# Patient Record
Sex: FEMALE | Race: OTHER | HISPANIC OR LATINO | URBAN - METROPOLITAN AREA
[De-identification: names, ages, dates, MRNs, and addresses within clinical notes are randomized per-mention and may not be internally consistent; named-entity substitution may affect disease eponyms.]

---

## 2017-08-25 ENCOUNTER — EMERGENCY (EMERGENCY)
Facility: HOSPITAL | Age: 22
LOS: 1 days | Discharge: HOME | End: 2017-08-25
Attending: EMERGENCY MEDICINE
Payer: MEDICAID

## 2017-08-25 VITALS
WEIGHT: 154.98 LBS | SYSTOLIC BLOOD PRESSURE: 120 MMHG | HEART RATE: 83 BPM | HEIGHT: 62 IN | TEMPERATURE: 97 F | DIASTOLIC BLOOD PRESSURE: 68 MMHG | RESPIRATION RATE: 18 BRPM | OXYGEN SATURATION: 99 %

## 2017-08-25 VITALS
HEART RATE: 86 BPM | DIASTOLIC BLOOD PRESSURE: 58 MMHG | OXYGEN SATURATION: 100 % | RESPIRATION RATE: 16 BRPM | SYSTOLIC BLOOD PRESSURE: 111 MMHG

## 2017-08-25 DIAGNOSIS — M54.5 LOW BACK PAIN: ICD-10-CM

## 2017-08-25 LAB
APPEARANCE UR: CLEAR — SIGNIFICANT CHANGE UP
BILIRUB UR-MCNC: NEGATIVE — SIGNIFICANT CHANGE UP
COLOR SPEC: YELLOW — SIGNIFICANT CHANGE UP
DIFF PNL FLD: ABNORMAL
GLUCOSE UR QL: NEGATIVE — SIGNIFICANT CHANGE UP
HCG UR QL: NEGATIVE — SIGNIFICANT CHANGE UP
KETONES UR-MCNC: NEGATIVE — SIGNIFICANT CHANGE UP
LEUKOCYTE ESTERASE UR-ACNC: ABNORMAL
NITRITE UR-MCNC: NEGATIVE — SIGNIFICANT CHANGE UP
PH UR: 6 — SIGNIFICANT CHANGE UP (ref 5–8)
PROT UR-MCNC: 30 MG/DL
SP GR SPEC: 1.01 — SIGNIFICANT CHANGE UP (ref 1.01–1.02)
UROBILINOGEN FLD QL: NEGATIVE — SIGNIFICANT CHANGE UP

## 2017-08-25 PROCEDURE — 74176 CT ABD & PELVIS W/O CONTRAST: CPT

## 2017-08-25 PROCEDURE — 99284 EMERGENCY DEPT VISIT MOD MDM: CPT | Mod: 25

## 2017-08-25 PROCEDURE — 99284 EMERGENCY DEPT VISIT MOD MDM: CPT

## 2017-08-25 PROCEDURE — 74176 CT ABD & PELVIS W/O CONTRAST: CPT | Mod: 26

## 2017-08-25 PROCEDURE — 87086 URINE CULTURE/COLONY COUNT: CPT

## 2017-08-25 PROCEDURE — 81025 URINE PREGNANCY TEST: CPT

## 2017-08-25 PROCEDURE — 81001 URINALYSIS AUTO W/SCOPE: CPT

## 2017-08-25 RX ORDER — METHOCARBAMOL 500 MG/1
2 TABLET, FILM COATED ORAL
Qty: 24 | Refills: 0
Start: 2017-08-25 | End: 2017-08-29

## 2017-08-25 RX ORDER — METHOCARBAMOL 500 MG/1
750 TABLET, FILM COATED ORAL ONCE
Qty: 0 | Refills: 0 | Status: COMPLETED | OUTPATIENT
Start: 2017-08-25 | End: 2017-08-25

## 2017-08-25 RX ADMIN — METHOCARBAMOL 750 MILLIGRAM(S): 500 TABLET, FILM COATED ORAL at 14:51

## 2017-08-25 RX ADMIN — Medication 500 MILLIGRAM(S): at 14:51

## 2017-08-25 NOTE — ED PROVIDER NOTE - MEDICAL DECISION MAKING DETAILS
20 y/o female c/o waxing and waning low back pain x 3 days. Will do UA/preg, treat pain and dc home with OP follow-up 20 y/o female c/o waxing and waning low back pain x 3 days. Will do UA/preg, treat pain and dc home with pain med/OP follow-up

## 2017-08-25 NOTE — ED ADULT NURSE NOTE - OBJECTIVE STATEMENT
C/O LOWER BACK PAIN RADIATES TO LOWER LEGS AND UPPER BACK X4 DAYS. DENIES INJURY OR FALL.SEEN AND EXAMINED BY DR. JAIN.

## 2017-08-25 NOTE — ED PROVIDER NOTE - OBJECTIVE STATEMENT
22 y/o female with no significant PMHx presents to ED c/o gradual onset, waxing and waning worsening low back pain x 3 days. Patient notes the pain radiates up her back. She has been taking Motrin 600 mg with mild sx relief. Patient has never had similar pain in the past. She reports the pain is worse when bending over or changing positions. Patient denies any neck pain, saddle anesthesia, Gu or GI problems, recent injury or trauma or any other complaints at this time. LMP 4 months ago due to hormonal contraceptive.

## 2017-08-26 LAB
CULTURE RESULTS: NO GROWTH — SIGNIFICANT CHANGE UP
SPECIMEN SOURCE: SIGNIFICANT CHANGE UP

## 2017-09-13 ENCOUNTER — EMERGENCY (EMERGENCY)
Facility: HOSPITAL | Age: 22
LOS: 1 days | Discharge: HOME | End: 2017-09-13
Attending: EMERGENCY MEDICINE
Payer: MEDICAID

## 2017-09-13 VITALS
HEART RATE: 74 BPM | RESPIRATION RATE: 16 BRPM | DIASTOLIC BLOOD PRESSURE: 70 MMHG | TEMPERATURE: 98 F | SYSTOLIC BLOOD PRESSURE: 120 MMHG | OXYGEN SATURATION: 98 %

## 2017-09-13 VITALS
WEIGHT: 154.1 LBS | SYSTOLIC BLOOD PRESSURE: 116 MMHG | OXYGEN SATURATION: 100 % | HEIGHT: 62 IN | DIASTOLIC BLOOD PRESSURE: 72 MMHG | TEMPERATURE: 98 F | HEART RATE: 77 BPM | RESPIRATION RATE: 16 BRPM

## 2017-09-13 DIAGNOSIS — M54.5 LOW BACK PAIN: ICD-10-CM

## 2017-09-13 LAB
ANION GAP SERPL CALC-SCNC: 6 MMOL/L — SIGNIFICANT CHANGE UP (ref 5–17)
APPEARANCE UR: CLEAR — SIGNIFICANT CHANGE UP
BASOPHILS # BLD AUTO: 0.1 K/UL — SIGNIFICANT CHANGE UP (ref 0–0.2)
BASOPHILS NFR BLD AUTO: 0.6 % — SIGNIFICANT CHANGE UP (ref 0–2)
BILIRUB UR-MCNC: NEGATIVE — SIGNIFICANT CHANGE UP
BUN SERPL-MCNC: 13 MG/DL — SIGNIFICANT CHANGE UP (ref 7–18)
CALCIUM SERPL-MCNC: 8.9 MG/DL — SIGNIFICANT CHANGE UP (ref 8.4–10.5)
CHLORIDE SERPL-SCNC: 105 MMOL/L — SIGNIFICANT CHANGE UP (ref 96–108)
CO2 SERPL-SCNC: 27 MMOL/L — SIGNIFICANT CHANGE UP (ref 22–31)
COLOR SPEC: YELLOW — SIGNIFICANT CHANGE UP
CREAT SERPL-MCNC: 0.95 MG/DL — SIGNIFICANT CHANGE UP (ref 0.5–1.3)
DIFF PNL FLD: NEGATIVE — SIGNIFICANT CHANGE UP
EOSINOPHIL # BLD AUTO: 0.1 K/UL — SIGNIFICANT CHANGE UP (ref 0–0.5)
EOSINOPHIL NFR BLD AUTO: 1.4 % — SIGNIFICANT CHANGE UP (ref 0–6)
GLUCOSE SERPL-MCNC: 149 MG/DL — HIGH (ref 70–99)
GLUCOSE UR QL: NEGATIVE — SIGNIFICANT CHANGE UP
HCG UR QL: NEGATIVE — SIGNIFICANT CHANGE UP
HCT VFR BLD CALC: 41.2 % — SIGNIFICANT CHANGE UP (ref 34.5–45)
HGB BLD-MCNC: 13.9 G/DL — SIGNIFICANT CHANGE UP (ref 11.5–15.5)
KETONES UR-MCNC: NEGATIVE — SIGNIFICANT CHANGE UP
LEUKOCYTE ESTERASE UR-ACNC: ABNORMAL
LYMPHOCYTES # BLD AUTO: 2.8 K/UL — SIGNIFICANT CHANGE UP (ref 1–3.3)
LYMPHOCYTES # BLD AUTO: 27.2 % — SIGNIFICANT CHANGE UP (ref 13–44)
MCHC RBC-ENTMCNC: 28.6 PG — SIGNIFICANT CHANGE UP (ref 27–34)
MCHC RBC-ENTMCNC: 33.7 GM/DL — SIGNIFICANT CHANGE UP (ref 32–36)
MCV RBC AUTO: 84.7 FL — SIGNIFICANT CHANGE UP (ref 80–100)
MONOCYTES # BLD AUTO: 0.6 K/UL — SIGNIFICANT CHANGE UP (ref 0–0.9)
MONOCYTES NFR BLD AUTO: 6.1 % — SIGNIFICANT CHANGE UP (ref 2–14)
NEUTROPHILS # BLD AUTO: 6.7 K/UL — SIGNIFICANT CHANGE UP (ref 1.8–7.4)
NEUTROPHILS NFR BLD AUTO: 64.6 % — SIGNIFICANT CHANGE UP (ref 43–77)
NITRITE UR-MCNC: NEGATIVE — SIGNIFICANT CHANGE UP
PH UR: 5 — SIGNIFICANT CHANGE UP (ref 5–8)
PLATELET # BLD AUTO: 208 K/UL — SIGNIFICANT CHANGE UP (ref 150–400)
POTASSIUM SERPL-MCNC: 3.9 MMOL/L — SIGNIFICANT CHANGE UP (ref 3.5–5.3)
POTASSIUM SERPL-SCNC: 3.9 MMOL/L — SIGNIFICANT CHANGE UP (ref 3.5–5.3)
PROT UR-MCNC: 30 MG/DL
RBC # BLD: 4.87 M/UL — SIGNIFICANT CHANGE UP (ref 3.8–5.2)
RBC # FLD: 11.9 % — SIGNIFICANT CHANGE UP (ref 10.3–14.5)
SODIUM SERPL-SCNC: 138 MMOL/L — SIGNIFICANT CHANGE UP (ref 135–145)
SP GR SPEC: 1.01 — SIGNIFICANT CHANGE UP (ref 1.01–1.02)
UROBILINOGEN FLD QL: NEGATIVE — SIGNIFICANT CHANGE UP
WBC # BLD: 10.3 K/UL — SIGNIFICANT CHANGE UP (ref 3.8–10.5)
WBC # FLD AUTO: 10.3 K/UL — SIGNIFICANT CHANGE UP (ref 3.8–10.5)

## 2017-09-13 PROCEDURE — 96374 THER/PROPH/DIAG INJ IV PUSH: CPT

## 2017-09-13 PROCEDURE — 99284 EMERGENCY DEPT VISIT MOD MDM: CPT | Mod: 25

## 2017-09-13 PROCEDURE — 99284 EMERGENCY DEPT VISIT MOD MDM: CPT

## 2017-09-13 PROCEDURE — 81001 URINALYSIS AUTO W/SCOPE: CPT

## 2017-09-13 PROCEDURE — 80048 BASIC METABOLIC PNL TOTAL CA: CPT

## 2017-09-13 PROCEDURE — 81025 URINE PREGNANCY TEST: CPT

## 2017-09-13 PROCEDURE — 85027 COMPLETE CBC AUTOMATED: CPT

## 2017-09-13 RX ORDER — KETOROLAC TROMETHAMINE 30 MG/ML
30 SYRINGE (ML) INJECTION ONCE
Qty: 0 | Refills: 0 | Status: DISCONTINUED | OUTPATIENT
Start: 2017-09-13 | End: 2017-09-13

## 2017-09-13 RX ADMIN — Medication 30 MILLIGRAM(S): at 20:40

## 2017-09-13 RX ADMIN — Medication 30 MILLIGRAM(S): at 20:16

## 2017-09-13 NOTE — ED PROVIDER NOTE - PROGRESS NOTE DETAILS
CT previously done shows bone assessment WNL. Labs WNL. UA slight leukocyte. Patient is sexually active, 1 partner,  unprotected sex. No /GYN complaints. Will send GC/chlamydia cultures. Patient was seen in the ED by Dr Shin (ortho) and will need follow up with him. Patient already takes IBU and muscle relaxant. Pt is well appearing walking with steady gait, stable for discharge and follow up without fail with medical doctor. I had a detailed discussion with the patient and/or guardian regarding the historical points, exam findings, and any diagnostic results supporting the discharge diagnosis. Pt educated on care and need for follow up. Strict return instructions and red flag signs and symptoms discussed with patient. Questions answered. Pt shows understanding of discharge information and agrees to follow.

## 2017-09-13 NOTE — ED PROVIDER NOTE - ATTENDING CONTRIBUTION TO CARE
low back pain  is CC  PE:  mild paralumbar tenderness. no trauma, no CVa tenderness, nonfocal.  A/P:  back pain.  supportive care with pmd and ortho f/u

## 2017-09-13 NOTE — ED PROVIDER NOTE - PHYSICAL EXAMINATION
Back is symmetrical, scapulae are symmetric. Neck has full range of motion. Mild lumbar midline tenderness to palpation. no paraspinal tenderness, deformity or step-offs. All limbs equally strong 5/5 bilaterally. Strong ankle dorsiflexion and plantar flexion against resistance bilaterally. Strong flexion/extension of big toe bilaterally. Pt is able to walk in straight line with steady gait. No recent weight loss or night sweats. No saddle anesthesia, no bowel/bladder incontinence or retention, no lower extremity weakness.

## 2017-09-13 NOTE — ED PROVIDER NOTE - OBJECTIVE STATEMENT
21 year-old female, history of ALL in remission (11 years ago), presents with cc back pain. Gradual onset of lower back pain 1 month ago. No triggering events. Pain is across lower back, aching/pulling, intermittently radiating to bilateral mid- posterior thighs, worsened with bending down. No history of injury. Denies numbness/tingling, focal weakness, saddle anesthesia, IVDU, recent instrumentation, urinary/bowel dysfunction, vaginal bleeding/discharge, urinary symptoms, weight loss, fevers, night sweats, easy bruising, generalized weakness or any other complaints. Was in the ED for similar complaint 3 weeks ago.

## 2017-09-13 NOTE — ED PROVIDER NOTE - MEDICAL DECISION MAKING DETAILS
21 year-old female, presents with persistent lower back pain x 1 month. Well-appearing, vital signs within normal limits, afebrile. No focal neuro deficits. Plan: labs, urine, meds, re-assess with likely dc home.

## 2019-05-29 ENCOUNTER — OUTPATIENT (OUTPATIENT)
Dept: OUTPATIENT SERVICES | Facility: HOSPITAL | Age: 24
LOS: 1 days | End: 2019-05-29
Payer: MEDICAID

## 2019-05-29 ENCOUNTER — RESULT REVIEW (OUTPATIENT)
Age: 24
End: 2019-05-29

## 2019-05-29 DIAGNOSIS — K92.2 GASTROINTESTINAL HEMORRHAGE, UNSPECIFIED: ICD-10-CM

## 2019-05-29 PROBLEM — C95.90 LEUKEMIA, UNSPECIFIED NOT HAVING ACHIEVED REMISSION: Chronic | Status: ACTIVE | Noted: 2017-08-25

## 2019-05-29 LAB — HCG UR QL: NEGATIVE — SIGNIFICANT CHANGE UP

## 2019-05-29 PROCEDURE — 88305 TISSUE EXAM BY PATHOLOGIST: CPT | Mod: 26

## 2019-05-29 PROCEDURE — 88312 SPECIAL STAINS GROUP 1: CPT | Mod: 26

## 2019-05-29 PROCEDURE — 43239 EGD BIOPSY SINGLE/MULTIPLE: CPT

## 2019-05-29 PROCEDURE — 88312 SPECIAL STAINS GROUP 1: CPT

## 2019-05-29 PROCEDURE — 81025 URINE PREGNANCY TEST: CPT

## 2019-05-29 PROCEDURE — 88305 TISSUE EXAM BY PATHOLOGIST: CPT

## 2019-05-30 ENCOUNTER — TRANSCRIPTION ENCOUNTER (OUTPATIENT)
Age: 24
End: 2019-05-30

## 2019-05-31 LAB — SURGICAL PATHOLOGY STUDY: SIGNIFICANT CHANGE UP

## 2022-10-20 ENCOUNTER — RESULT REVIEW (OUTPATIENT)
Age: 27
End: 2022-10-20

## 2023-01-07 ENCOUNTER — INPATIENT (INPATIENT)
Facility: HOSPITAL | Age: 28
LOS: 3 days | Discharge: HOME | DRG: 301 | End: 2023-01-11
Attending: INTERNAL MEDICINE | Admitting: INTERNAL MEDICINE
Payer: MEDICAID

## 2023-01-07 VITALS
HEART RATE: 99 BPM | DIASTOLIC BLOOD PRESSURE: 79 MMHG | HEIGHT: 62 IN | TEMPERATURE: 98 F | WEIGHT: 201.94 LBS | SYSTOLIC BLOOD PRESSURE: 135 MMHG | OXYGEN SATURATION: 99 % | RESPIRATION RATE: 18 BRPM

## 2023-01-07 DIAGNOSIS — I82.890 ACUTE EMBOLISM AND THROMBOSIS OF OTHER SPECIFIED VEINS: ICD-10-CM

## 2023-01-07 LAB
ALBUMIN SERPL ELPH-MCNC: 3.7 G/DL — SIGNIFICANT CHANGE UP (ref 3.5–5)
ALP SERPL-CCNC: 118 U/L — SIGNIFICANT CHANGE UP (ref 40–120)
ALT FLD-CCNC: 15 U/L DA — SIGNIFICANT CHANGE UP (ref 10–60)
ANION GAP SERPL CALC-SCNC: 12 MMOL/L — SIGNIFICANT CHANGE UP (ref 5–17)
AST SERPL-CCNC: 9 U/L — LOW (ref 10–40)
BASOPHILS # BLD AUTO: 0.04 K/UL — SIGNIFICANT CHANGE UP (ref 0–0.2)
BASOPHILS NFR BLD AUTO: 0.3 % — SIGNIFICANT CHANGE UP (ref 0–2)
BILIRUB SERPL-MCNC: 0.3 MG/DL — SIGNIFICANT CHANGE UP (ref 0.2–1.2)
BUN SERPL-MCNC: 17 MG/DL — SIGNIFICANT CHANGE UP (ref 7–18)
CALCIUM SERPL-MCNC: 9.5 MG/DL — SIGNIFICANT CHANGE UP (ref 8.4–10.5)
CHLORIDE SERPL-SCNC: 104 MMOL/L — SIGNIFICANT CHANGE UP (ref 96–108)
CO2 SERPL-SCNC: 25 MMOL/L — SIGNIFICANT CHANGE UP (ref 22–31)
CREAT SERPL-MCNC: 0.9 MG/DL — SIGNIFICANT CHANGE UP (ref 0.5–1.3)
EGFR: 90 ML/MIN/1.73M2 — SIGNIFICANT CHANGE UP
EOSINOPHIL # BLD AUTO: 0.15 K/UL — SIGNIFICANT CHANGE UP (ref 0–0.5)
EOSINOPHIL NFR BLD AUTO: 1.2 % — SIGNIFICANT CHANGE UP (ref 0–6)
FLUAV AG NPH QL: SIGNIFICANT CHANGE UP
FLUBV AG NPH QL: SIGNIFICANT CHANGE UP
GLUCOSE SERPL-MCNC: 113 MG/DL — HIGH (ref 70–99)
HCT VFR BLD CALC: 40.2 % — SIGNIFICANT CHANGE UP (ref 34.5–45)
HGB BLD-MCNC: 13.4 G/DL — SIGNIFICANT CHANGE UP (ref 11.5–15.5)
IMM GRANULOCYTES NFR BLD AUTO: 0.2 % — SIGNIFICANT CHANGE UP (ref 0–0.9)
LIDOCAIN IGE QN: 41 U/L — LOW (ref 73–393)
LYMPHOCYTES # BLD AUTO: 2.74 K/UL — SIGNIFICANT CHANGE UP (ref 1–3.3)
LYMPHOCYTES # BLD AUTO: 21.1 % — SIGNIFICANT CHANGE UP (ref 13–44)
MCHC RBC-ENTMCNC: 25.9 PG — LOW (ref 27–34)
MCHC RBC-ENTMCNC: 33.3 GM/DL — SIGNIFICANT CHANGE UP (ref 32–36)
MCV RBC AUTO: 77.6 FL — LOW (ref 80–100)
MONOCYTES # BLD AUTO: 0.77 K/UL — SIGNIFICANT CHANGE UP (ref 0–0.9)
MONOCYTES NFR BLD AUTO: 5.9 % — SIGNIFICANT CHANGE UP (ref 2–14)
NEUTROPHILS # BLD AUTO: 9.27 K/UL — HIGH (ref 1.8–7.4)
NEUTROPHILS NFR BLD AUTO: 71.3 % — SIGNIFICANT CHANGE UP (ref 43–77)
NRBC # BLD: 0 /100 WBCS — SIGNIFICANT CHANGE UP (ref 0–0)
PLATELET # BLD AUTO: 314 K/UL — SIGNIFICANT CHANGE UP (ref 150–400)
POTASSIUM SERPL-MCNC: 3.6 MMOL/L — SIGNIFICANT CHANGE UP (ref 3.5–5.3)
POTASSIUM SERPL-SCNC: 3.6 MMOL/L — SIGNIFICANT CHANGE UP (ref 3.5–5.3)
PROT SERPL-MCNC: 7.9 G/DL — SIGNIFICANT CHANGE UP (ref 6–8.3)
RBC # BLD: 5.18 M/UL — SIGNIFICANT CHANGE UP (ref 3.8–5.2)
RBC # FLD: 13.3 % — SIGNIFICANT CHANGE UP (ref 10.3–14.5)
SARS-COV-2 RNA SPEC QL NAA+PROBE: SIGNIFICANT CHANGE UP
SODIUM SERPL-SCNC: 141 MMOL/L — SIGNIFICANT CHANGE UP (ref 135–145)
WBC # BLD: 13 K/UL — HIGH (ref 3.8–10.5)
WBC # FLD AUTO: 13 K/UL — HIGH (ref 3.8–10.5)

## 2023-01-07 PROCEDURE — 99285 EMERGENCY DEPT VISIT HI MDM: CPT

## 2023-01-07 RX ORDER — MORPHINE SULFATE 50 MG/1
4 CAPSULE, EXTENDED RELEASE ORAL ONCE
Refills: 0 | Status: DISCONTINUED | OUTPATIENT
Start: 2023-01-07 | End: 2023-01-07

## 2023-01-07 RX ADMIN — MORPHINE SULFATE 4 MILLIGRAM(S): 50 CAPSULE, EXTENDED RELEASE ORAL at 22:17

## 2023-01-07 NOTE — ED PROVIDER NOTE - DIFFERENTIAL DIAGNOSIS
spelnic vein thrombosis concerns include but not limited to hypercoag state, infection and inflammation Differential Diagnosis

## 2023-01-07 NOTE — ED PROVIDER NOTE - PHYSICAL EXAMINATION
Gen.  no acute distress no resp distress  HEENT:  perrl eomi  Lungs:  b/l bs  CVS: S1S2   Abd;  no guarding no distention., + ttp over left upper quad and + CVA tend on left side  Ext: no edema no erythema no calf tend  Neuro: aaox3 no focal deficit  MSK: strength 5/5 b/l upper and lower ext.

## 2023-01-07 NOTE — ED ADULT NURSE NOTE - OBJECTIVE STATEMENT
27 yr olf female patient presented to the ER aox4 ambulatory complains of left upper quadrant abdominal pain for the past 5 days. PMHX CA. .  Patient stated that had an MRI  done on 1/2/2023. Reports showed that patient has an enlarge spleen and liver. Awaiting for MD to see and examined patient. Will continue to monitor.

## 2023-01-07 NOTE — ED PROVIDER NOTE - CLINICAL SUMMARY MEDICAL DECISION MAKING FREE TEXT BOX
ATTG: : Assessment and plan: Abdominal pain with recently diagnosed splenic vein thrombosis.  We will check labs check coags and consult vascular for recommendations.  Likely admit for further care and work-up

## 2023-01-07 NOTE — ED PROVIDER NOTE - NS ED ROS FT
Constitutional: no fever no chills  Eyes: no conjunctivitis  Ears: no ear pain no tinnitus  Nose: no nasal congestion, Mouth/Throat: no throat pain, Neck: no stiffness  Cardiovascular: no chest pain  Respiratory: no shortness of breath no cough  Gastrointestinal: see hpi  MSK: no joint pain  : no  dysuria  Skin: no rash  Neuro: no LOC no seizures

## 2023-01-07 NOTE — ED ADULT NURSE NOTE - CAS EDN DISCHARGE ASSESSMENT
No Heavy lifting/straining Alert and oriented to person, place and time Showering allowed/No Heavy lifting/straining

## 2023-01-07 NOTE — ED PROVIDER NOTE - OBJECTIVE STATEMENT
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  She has been seeing an endocrinologist for the concern of elevated blood sugar who recently referred her to a gastroenterologist  for eval of pancrease. She developed left side abdominal pain over the last week and since then with her visit with the gastroenterologist was sent for a CT scan and eventually an MRI as well and found to have abnormal results.  MRI of the abdomen reveals that there is absence of the pancreatic neck body and tail without evidence for focal pancreatic lesion.  There is also chronic splenic vein thrombosis with upper abdominal venous collaterals gastric varices and hepatomegaly.  There is no ductal dilatation this was an MRI that was completed on 1/3/2023 at [facility].  Patient describes her abdominal pain is sharp at times and continuous for the last few days.  It is located on the left side and radiates to her back.  There is no associated vomiting but there is nausea.  There is no diarrhea there is no urinary symptoms.  There is no hematuria.  There is no vaginal discharge.  There is no associated fever or chills.  There is no chest pain

## 2023-01-08 DIAGNOSIS — R10.9 UNSPECIFIED ABDOMINAL PAIN: ICD-10-CM

## 2023-01-08 DIAGNOSIS — I82.891 CHRONIC EMBOLISM AND THROMBOSIS OF OTHER SPECIFIED VEINS: ICD-10-CM

## 2023-01-08 DIAGNOSIS — E28.2 POLYCYSTIC OVARIAN SYNDROME: ICD-10-CM

## 2023-01-08 DIAGNOSIS — E11.9 TYPE 2 DIABETES MELLITUS WITHOUT COMPLICATIONS: ICD-10-CM

## 2023-01-08 DIAGNOSIS — Z29.9 ENCOUNTER FOR PROPHYLACTIC MEASURES, UNSPECIFIED: ICD-10-CM

## 2023-01-08 LAB
APPEARANCE UR: CLEAR — SIGNIFICANT CHANGE UP
BACTERIA # UR AUTO: ABNORMAL /HPF
BILIRUB UR-MCNC: NEGATIVE — SIGNIFICANT CHANGE UP
COLOR SPEC: YELLOW — SIGNIFICANT CHANGE UP
COMMENT - URINE: SIGNIFICANT CHANGE UP
DIFF PNL FLD: ABNORMAL
EPI CELLS # UR: SIGNIFICANT CHANGE UP /HPF
GLUCOSE BLDC GLUCOMTR-MCNC: 108 MG/DL — HIGH (ref 70–99)
GLUCOSE BLDC GLUCOMTR-MCNC: 115 MG/DL — HIGH (ref 70–99)
GLUCOSE BLDC GLUCOMTR-MCNC: 119 MG/DL — HIGH (ref 70–99)
GLUCOSE BLDC GLUCOMTR-MCNC: 88 MG/DL — SIGNIFICANT CHANGE UP (ref 70–99)
GLUCOSE UR QL: NEGATIVE — SIGNIFICANT CHANGE UP
KETONES UR-MCNC: NEGATIVE — SIGNIFICANT CHANGE UP
LEUKOCYTE ESTERASE UR-ACNC: NEGATIVE — SIGNIFICANT CHANGE UP
NITRITE UR-MCNC: NEGATIVE — SIGNIFICANT CHANGE UP
PH UR: 5 — SIGNIFICANT CHANGE UP (ref 5–8)
PROT UR-MCNC: 30 MG/DL
RBC CASTS # UR COMP ASSIST: ABNORMAL /HPF (ref 0–2)
SP GR SPEC: 1.02 — SIGNIFICANT CHANGE UP (ref 1.01–1.02)
UROBILINOGEN FLD QL: NEGATIVE — SIGNIFICANT CHANGE UP
WBC UR QL: SIGNIFICANT CHANGE UP /HPF (ref 0–5)

## 2023-01-08 RX ORDER — SPIRONOLACTONE 25 MG/1
50 TABLET, FILM COATED ORAL DAILY
Refills: 0 | Status: DISCONTINUED | OUTPATIENT
Start: 2023-01-08 | End: 2023-01-11

## 2023-01-08 RX ORDER — INSULIN LISPRO 100/ML
VIAL (ML) SUBCUTANEOUS AT BEDTIME
Refills: 0 | Status: DISCONTINUED | OUTPATIENT
Start: 2023-01-08 | End: 2023-01-11

## 2023-01-08 RX ORDER — INSULIN LISPRO 100/ML
VIAL (ML) SUBCUTANEOUS
Refills: 0 | Status: DISCONTINUED | OUTPATIENT
Start: 2023-01-08 | End: 2023-01-11

## 2023-01-08 RX ORDER — ACETAMINOPHEN 500 MG
650 TABLET ORAL EVERY 6 HOURS
Refills: 0 | Status: DISCONTINUED | OUTPATIENT
Start: 2023-01-08 | End: 2023-01-11

## 2023-01-08 RX ADMIN — Medication 650 MILLIGRAM(S): at 20:00

## 2023-01-08 RX ADMIN — Medication 650 MILLIGRAM(S): at 09:30

## 2023-01-08 RX ADMIN — Medication 650 MILLIGRAM(S): at 10:30

## 2023-01-08 RX ADMIN — SPIRONOLACTONE 50 MILLIGRAM(S): 25 TABLET, FILM COATED ORAL at 19:59

## 2023-01-08 NOTE — H&P ADULT - HISTORY OF PRESENT ILLNESS
27 year old F with PMH of leukemia (at age 10, remission 14 years), pre-DM, remote h/o acute pancreatitis presents with left sided abdominal pain for3  days. Patient states she has had left upper quadrant sided 7/10 stabbing-pressure-like pain to the back associated with nausea. Patient denies any vomiting or diarrhea, and states the pain has improved to 3/10 since getting morphine in ED. She reports that she had pancreatitis when she was 14 years old while she had leukemia. States she was seen by endocrinology recently for elevated blood glucose, and underwent CT abdomen prior to starting ozempic to evaluate for any pancreatic issues. 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 Patient was referred to GI and underwent MR abdomen which confirmed absences of pancreatic neck, body and tail as well as chronic splenic vein thrombosis with upper abdominal collaterals, gastric varices and hepatomegaly. Also showed cholelithiasis without ductal dilatation.  States she was on OCP from 2016 until 1 year ago.     ED Course:  Vitals:  135/79 P 99 R 18 T 98.2 F SpO2 99% RA  Meds: morphine 4 mg

## 2023-01-08 NOTE — H&P ADULT - NSHPPHYSICALEXAM_GEN_ALL_CORE
PHYSICAL EXAM:  GENERAL: NAD, lying in bed comfortably  HEAD:  Atraumatic, Normocephalic  EYES: EOMI, PERRLA, conjunctiva and sclera clear  ENT: Moist mucous membranes  NECK: Supple, No JVD  CHEST/LUNG: Clear to auscultation bilaterally; No rales, rhonchi, wheezing, or rubs.  HEART: Regular rate and rhythm; No murmurs, rubs, or gallops  ABDOMEN: Bowel sounds present; Soft, Nontender, Nondistended.  EXTREMITIES:  2+ Peripheral Pulses, brisk capillary refill. No clubbing, cyanosis, or edema  NERVOUS SYSTEM:  Alert & Oriented X3, speech clear. No deficits   MSK: FROM all 4 extremities, full and equal strength  SKIN: No rashes or lesions

## 2023-01-08 NOTE — H&P ADULT - PROBLEM SELECTOR PLAN 1
pt presents with LUQ abdominal pain, minimal tenderness to palpation on exam  recent CT/MRI from 1/3/23 show absences of pancreatic neck, body and tail as well as chronic splenic vein thrombosis with upper abdominal collaterals, gastric varices and hepatomegaly. Also showed cholelithiasis without ductal dilatation.  Lipase 41, unlikely acute pancreatitis  pain improved s/p morphine    - pain control pt presents with LUQ abdominal pain, minimal tenderness to palpation on exam  recent CT/MRI from 1/3/23 show absences of pancreatic neck, body and tail as well as chronic splenic vein thrombosis with upper abdominal collaterals, gastric varices and hepatomegaly. Also showed cholelithiasis without ductal dilatation.  Lipase 41, unlikely acute pancreatitis  pain improved s/p morphine    - pain control  f/u fobt r/o blood loss in GI tract

## 2023-01-08 NOTE — H&P ADULT - ASSESSMENT
27 year old F with PMH of leukemia (at age 10, remission 14 years), pre-DM, remote h/o acute pancreatitis presents with left sided abdominal pain for 3 days.

## 2023-01-08 NOTE — H&P ADULT - PROBLEM SELECTOR PLAN 2
recent CT/MRI from 1/3/23 show absences of pancreatic neck, body and tail as well as chronic splenic vein thrombosis with upper abdominal collaterals, gastric varices and hepatomegaly. Also showed cholelithiasis without ductal dilatation.    - heme onc consult, Dr. Martinez recent CT/MRI from 1/3/23 show absences of pancreatic neck, body and tail as well as chronic splenic vein thrombosis with upper abdominal collaterals, gastric varices and hepatomegaly. Also showed cholelithiasis without ductal dilatation.    - heme onc consulted, Dr. Martinez  - f/u

## 2023-01-09 DIAGNOSIS — K80.20 CALCULUS OF GALLBLADDER WITHOUT CHOLECYSTITIS WITHOUT OBSTRUCTION: ICD-10-CM

## 2023-01-09 DIAGNOSIS — Z02.9 ENCOUNTER FOR ADMINISTRATIVE EXAMINATIONS, UNSPECIFIED: ICD-10-CM

## 2023-01-09 LAB
A1C WITH ESTIMATED AVERAGE GLUCOSE RESULT: 5.9 % — HIGH (ref 4–5.6)
ESTIMATED AVERAGE GLUCOSE: 123 MG/DL — HIGH (ref 68–114)
GLUCOSE BLDC GLUCOMTR-MCNC: 105 MG/DL — HIGH (ref 70–99)
GLUCOSE BLDC GLUCOMTR-MCNC: 109 MG/DL — HIGH (ref 70–99)
GLUCOSE BLDC GLUCOMTR-MCNC: 76 MG/DL — SIGNIFICANT CHANGE UP (ref 70–99)
GLUCOSE BLDC GLUCOMTR-MCNC: 94 MG/DL — SIGNIFICANT CHANGE UP (ref 70–99)
HCG UR QL: NEGATIVE — SIGNIFICANT CHANGE UP

## 2023-01-09 PROCEDURE — G0452: CPT | Mod: 26

## 2023-01-09 RX ADMIN — Medication 650 MILLIGRAM(S): at 20:23

## 2023-01-09 RX ADMIN — SPIRONOLACTONE 50 MILLIGRAM(S): 25 TABLET, FILM COATED ORAL at 20:01

## 2023-01-09 RX ADMIN — Medication 650 MILLIGRAM(S): at 19:44

## 2023-01-09 NOTE — PROGRESS NOTE ADULT - ASSESSMENT
27 year old F with PMH of leukemia (at age 10, remission 14 years), pre-DM, remote h/o acute pancreatitis presents with left sided abdominal pain for3  days.    ALL   - follows with Dr Martinez   - in remission       Abd pain   - pt with abd pain for about 3 days   - pt had imaging at [facility] by her GI before having abd pain   - MRI from 1/3/23 as outpt:  Absence of the pancreatic neck, body and tail without evidence for a focal pancreatic lesion. Similar findings of chronic splenic vein thrombosis with upper abdominal venous collaterals, gastric varices and hepatomegaly. Cholelithiasis. No ductal dilatation.   - GI consult   - vascular eval  - hold ac as chronic splenic thrombus. Cause unclear, due to underlying sickle cell trait vs hypercoag state vs vasculitis    d/w pt and primary team, will follow

## 2023-01-09 NOTE — PROGRESS NOTE ADULT - PROBLEM SELECTOR PLAN 3
pt has h/o "elevated DHEA", takes spironolactone 50 mg qd    - c/w home med pt has h/o "elevated DHEA", takes spironolactone 50 mg qd  - c/w home med

## 2023-01-09 NOTE — PROGRESS NOTE ADULT - SUBJECTIVE AND OBJECTIVE BOX
NP Note discussed with  primary attending    Patient is a 27y old  Female who presents with a chief complaint of Abdominal pain (2023 06:25)      INTERVAL HPI/OVERNIGHT EVENTS: no new complaints    MEDICATIONS  (STANDING):  insulin lispro (ADMELOG) corrective regimen sliding scale   SubCutaneous three times a day before meals  insulin lispro (ADMELOG) corrective regimen sliding scale   SubCutaneous at bedtime  spironolactone 50 milliGRAM(s) Oral daily    MEDICATIONS  (PRN):  acetaminophen     Tablet .. 650 milliGRAM(s) Oral every 6 hours PRN Temp greater or equal to 38C (100.4F), Mild Pain (1 - 3)      __________________________________________________  REVIEW OF SYSTEMS:    CONSTITUTIONAL: No fever,   EYES: no acute visual disturbances  NECK: No pain or stiffness  RESPIRATORY: No cough; No shortness of breath  CARDIOVASCULAR: No chest pain, no palpitations  GASTROINTESTINAL: No pain. No nausea or vomiting; No diarrhea   NEUROLOGICAL: No headache or numbness, no tremors  MUSCULOSKELETAL: No joint pain, no muscle pain  GENITOURINARY: no dysuria, no frequency, no hesitancy  PSYCHIATRY: no depression , no anxiety  ALL OTHER  ROS negative        Vital Signs Last 24 Hrs  T(C): 36.9 (2023 12:52), Max: 37.1 (2023 20:57)  T(F): 98.5 (2023 12:52), Max: 98.7 (2023 20:57)  HR: 85 (2023 12:52) (69 - 85)  BP: 101/48 (2023 12:52) (95/60 - 102/70)  BP(mean): 71 (2023 16:00) (71 - 71)  RR: 18 (2023 12:52) (17 - 18)  SpO2: 96% (2023 12:52) (96% - 99%)    Parameters below as of 2023 12:52  Patient On (Oxygen Delivery Method): room air        ________________________________________________  PHYSICAL EXAM:  GENERAL: NAD  HEENT: Normocephalic;  conjunctivae and sclerae clear; moist mucous membranes;   NECK : supple  CHEST/LUNG: Clear to ausculitation bilaterally with good air entry   HEART: S1 S2  regular; no murmurs, gallops or rubs  ABDOMEN: Soft, Nontender, Nondistended; Bowel sounds present  EXTREMITIES: no cyanosis; no edema; no calf tenderness  SKIN: warm and dry; no rash  NERVOUS SYSTEM:  Awake and alert; Oriented  to place, person and time ; no new deficits    _________________________________________________  LABS:                        13.4   13.00 )-----------( 314      ( 2023 22:00 )             40.2         141  |  104  |  17  ----------------------------<  113<H>  3.6   |  25  |  0.90    Ca    9.5      2023 22:00    TPro  7.9  /  Alb  3.7  /  TBili  0.3  /  DBili  x   /  AST  9<L>  /  ALT  15  /  AlkPhos  118  01-07      Urinalysis Basic - ( 2023 10:15 )    Color: Yellow / Appearance: Clear / S.025 / pH: x  Gluc: x / Ketone: Negative  / Bili: Negative / Urobili: Negative   Blood: x / Protein: 30 mg/dL / Nitrite: Negative   Leuk Esterase: Negative / RBC: 2-5 /HPF / WBC 0-2 /HPF   Sq Epi: x / Non Sq Epi: Few /HPF / Bacteria: Trace /HPF      CAPILLARY BLOOD GLUCOSE      POCT Blood Glucose.: 109 mg/dL (2023 11:19)  POCT Blood Glucose.: 105 mg/dL (2023 07:58)  POCT Blood Glucose.: 119 mg/dL (2023 21:23)  POCT Blood Glucose.: 108 mg/dL (2023 17:34)        RADIOLOGY & ADDITIONAL TESTS:    Imaging Personally Reviewed:  YES/NO    Consultant(s) Notes Reviewed:   YES/ No    Care Discussed with Consultants :     Plan of care was discussed with patient and /or primary care giver; all questions and concerns were addressed and care was aligned with patient's wishes.     NP Note discussed with  primary attending    Patient is a 27y old  Female who presents with a chief complaint of Abdominal pain (2023 06:25)      INTERVAL HPI/OVERNIGHT EVENTS: Requesting vascular consult    MEDICATIONS  (STANDING):  insulin lispro (ADMELOG) corrective regimen sliding scale   SubCutaneous three times a day before meals  insulin lispro (ADMELOG) corrective regimen sliding scale   SubCutaneous at bedtime  spironolactone 50 milliGRAM(s) Oral daily    MEDICATIONS  (PRN):  acetaminophen     Tablet .. 650 milliGRAM(s) Oral every 6 hours PRN Temp greater or equal to 38C (100.4F), Mild Pain (1 - 3)      __________________________________________________  REVIEW OF SYSTEMS:    CONSTITUTIONAL: No fever,   EYES: no acute visual disturbances  NECK: No pain or stiffness  RESPIRATORY: No cough; No shortness of breath  CARDIOVASCULAR: No chest pain, no palpitations  GASTROINTESTINAL: +pain. No nausea or vomiting; No diarrhea   NEUROLOGICAL: No headache or numbness, no tremors  MUSCULOSKELETAL: No joint pain, no muscle pain  GENITOURINARY: no dysuria, no frequency, no hesitancy  PSYCHIATRY: no depression , no anxiety  ALL OTHER  ROS negative        Vital Signs Last 24 Hrs  T(C): 36.9 (2023 12:52), Max: 37.1 (2023 20:57)  T(F): 98.5 (2023 12:52), Max: 98.7 (2023 20:57)  HR: 85 (2023 12:52) (69 - 85)  BP: 101/48 (2023 12:52) (95/60 - 102/70)  BP(mean): 71 (2023 16:00) (71 - 71)  RR: 18 (2023 12:52) (17 - 18)  SpO2: 96% (2023 12:52) (96% - 99%)    Parameters below as of 2023 12:52  Patient On (Oxygen Delivery Method): room air        ________________________________________________  PHYSICAL EXAM:  GENERAL: NAD  HEENT: Normocephalic;  conjunctivae and sclerae clear;   NECK : supple  CHEST/LUNG: Clear to ausculitation bilaterally with good air entry   HEART: S1 S2  regular; no murmurs, gallops or rubs  ABDOMEN: Soft, Nontender, Nondistended; Bowel sounds present  EXTREMITIES: no cyanosis; no edema; no calf tenderness  SKIN: warm and dry; no rash  NERVOUS SYSTEM:  Awake and alert; Oriented  to place, person and time  _________________________________________________  LABS:                        13.4   13.00 )-----------( 314      ( 2023 22:00 )             40.2     01-    141  |  104  |  17  ----------------------------<  113<H>  3.6   |  25  |  0.90    Ca    9.5      2023 22:00    TPro  7.9  /  Alb  3.7  /  TBili  0.3  /  DBili  x   /  AST  9<L>  /  ALT  15  /  AlkPhos  118  01-07      Urinalysis Basic - ( 2023 10:15 )    Color: Yellow / Appearance: Clear / S.025 / pH: x  Gluc: x / Ketone: Negative  / Bili: Negative / Urobili: Negative   Blood: x / Protein: 30 mg/dL / Nitrite: Negative   Leuk Esterase: Negative / RBC: 2-5 /HPF / WBC 0-2 /HPF   Sq Epi: x / Non Sq Epi: Few /HPF / Bacteria: Trace /HPF      CAPILLARY BLOOD GLUCOSE      POCT Blood Glucose.: 109 mg/dL (2023 11:19)  POCT Blood Glucose.: 105 mg/dL (2023 07:58)  POCT Blood Glucose.: 119 mg/dL (2023 21:23)  POCT Blood Glucose.: 108 mg/dL (2023 17:34)      Consultant(s) Notes Reviewed:   YES/      Plan of care was discussed with patient and /or primary care giver; all questions and concerns were addressed and care was aligned with patient's wishes.

## 2023-01-09 NOTE — PROGRESS NOTE ADULT - ASSESSMENT
27 year old F with PMH of leukemia (at age 10, remission 14 years), pre-DM, remote h/o acute pancreatitis presents with left sided abdominal pain for 3 days. 27 year old F with PMH of leukemia (at age 10, remission 14 years), pre-DM, remote h/o acute pancreatitis presents with left sided abdominal pain. Outpatient MRI abdomen 1/3/23 found Absence of the pancreatic neck, body and tail without evidence for a focal pancreatic lesion. Chronic splenic vein thrombosis with upper abdominal venous collaterals, gastric varices and hepatomegaly. Cholelithiasis. Heme onc followed, reccs to hold A/C since thrombus is chronic. Plan to obtain CT Abd, and sonogram. GI Golyan and vascular consults

## 2023-01-09 NOTE — PROGRESS NOTE ADULT - PROBLEM SELECTOR PLAN 1
pt presents with LUQ abdominal pain, minimal tenderness to palpation on exam  recent CT/MRI from 1/3/23 show absences of pancreatic neck, body and tail as well as chronic splenic vein thrombosis with upper abdominal collaterals, gastric varices and hepatomegaly. Also showed cholelithiasis without ductal dilatation.  Lipase 41, unlikely acute pancreatitis  pain improved s/p morphine    - pain control  f/u fobt r/o blood loss in GI tract recent CT/MRI from 1/3/23 show absences of pancreatic neck, body and tail as well as chronic splenic vein thrombosis with upper abdominal collaterals, gastric varices and hepatomegaly. Also showed cholelithiasis without ductal dilatation.  - heme onc Dr. Martinez  [facility]   F/U repeat inpatient imaging   GI Ita consult  consult vascular once imaging results

## 2023-01-09 NOTE — PROGRESS NOTE ADULT - PROBLEM SELECTOR PLAN 2
recent CT/MRI from 1/3/23 show absences of pancreatic neck, body and tail as well as chronic splenic vein thrombosis with upper abdominal collaterals, gastric varices and hepatomegaly. Also showed cholelithiasis without ductal dilatation.    - heme onc consulted, Dr. Martinez  - f/u P/W abdominal pain   outpatient MRI abdomen found cholelithiasis  F/U abdominal sono

## 2023-01-09 NOTE — PROGRESS NOTE ADULT - SUBJECTIVE AND OBJECTIVE BOX
Pt seen, had unchanged abd discomfort, no other new complaints    MEDICATIONS  (STANDING):  insulin lispro (ADMELOG) corrective regimen sliding scale   SubCutaneous three times a day before meals  insulin lispro (ADMELOG) corrective regimen sliding scale   SubCutaneous at bedtime  spironolactone 50 milliGRAM(s) Oral daily    MEDICATIONS  (PRN):  acetaminophen     Tablet .. 650 milliGRAM(s) Oral every 6 hours PRN Temp greater or equal to 38C (100.4F), Mild Pain (1 - 3)      ROS  No fever, sweats, chills  No epistaxis, HA, sore throat  No CP, SOB, cough, sputum  No n/v/d, melena, hematochezia  No edema  No rash  No anxiety  No back pain, joint pain  No bleeding, bruising  No dysuria, hematuria    Vital Signs Last 24 Hrs  T(C): 37.2 (09 Jan 2023 20:54), Max: 37.2 (09 Jan 2023 20:54)  T(F): 98.9 (09 Jan 2023 20:54), Max: 98.9 (09 Jan 2023 20:54)  HR: 87 (09 Jan 2023 20:54) (69 - 87)  BP: 111/54 (09 Jan 2023 20:54) (99/63 - 111/54)  BP(mean): 68 (09 Jan 2023 20:54) (68 - 68)  RR: 18 (09 Jan 2023 20:54) (18 - 18)  SpO2: 99% (09 Jan 2023 20:54) (96% - 99%)    Parameters below as of 09 Jan 2023 20:54  Patient On (Oxygen Delivery Method): room air        PE  NAD  Awake, alert  Anicteric  limited 2/2 covid pandemic

## 2023-01-09 NOTE — PROGRESS NOTE ADULT - SUBJECTIVE AND OBJECTIVE BOX
Patient is a 27y old  Female who presents with a chief complaint of Abdominal pain (08 Jan 2023 19:19)    pt seen in icu [  ], reg med floor [   ], bed [  ], chair at bedside [   ], a+o x3 [  ], lethargic [  ],  nad [  ]    stanton [  ], ngt [  ], peg [  ], et tube [  ], cent line [  ], picc line [  ]        Allergies    No Known Allergies        Vitals    T(F): 97.6 (01-09-23 @ 05:10), Max: 98.7 (01-08-23 @ 20:57)  HR: 69 (01-09-23 @ 05:10) (69 - 85)  BP: 99/63 (01-09-23 @ 05:10) (95/60 - 102/70)  RR: 18 (01-09-23 @ 05:10) (17 - 18)  SpO2: 99% (01-09-23 @ 05:10) (97% - 99%)  Wt(kg): --  CAPILLARY BLOOD GLUCOSE      POCT Blood Glucose.: 119 mg/dL (08 Jan 2023 21:23)      Labs                          13.4   13.00 )-----------( 314      ( 07 Jan 2023 22:00 )             40.2       01-07    141  |  104  |  17  ----------------------------<  113<H>  3.6   |  25  |  0.90    Ca    9.5      07 Jan 2023 22:00    TPro  7.9  /  Alb  3.7  /  TBili  0.3  /  DBili  x   /  AST  9<L>  /  ALT  15  /  AlkPhos  118  01-07                Radiology Results      Meds    MEDICATIONS  (STANDING):  insulin lispro (ADMELOG) corrective regimen sliding scale   SubCutaneous three times a day before meals  insulin lispro (ADMELOG) corrective regimen sliding scale   SubCutaneous at bedtime  spironolactone 50 milliGRAM(s) Oral daily      MEDICATIONS  (PRN):  acetaminophen     Tablet .. 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     Patient is a 27y old  Female who presents with a chief complaint of Abdominal pain (08 Jan 2023 19:19)    pt seen in icu [  ], reg med floor [ x  ], bed [ x ], chair at bedside [   ], a+o x3 [x  ], lethargic [  ],  nad [ x ]      Allergies    No Known Allergies        Vitals    T(F): 97.6 (01-09-23 @ 05:10), Max: 98.7 (01-08-23 @ 20:57)  HR: 69 (01-09-23 @ 05:10) (69 - 85)  BP: 99/63 (01-09-23 @ 05:10) (95/60 - 102/70)  RR: 18 (01-09-23 @ 05:10) (17 - 18)  SpO2: 99% (01-09-23 @ 05:10) (97% - 99%)  Wt(kg): --  CAPILLARY BLOOD GLUCOSE      POCT Blood Glucose.: 119 mg/dL (08 Jan 2023 21:23)      Labs                          13.4   13.00 )-----------( 314      ( 07 Jan 2023 22:00 )             40.2       01-07    141  |  104  |  17  ----------------------------<  113<H>  3.6   |  25  |  0.90    Ca    9.5      07 Jan 2023 22:00    TPro  7.9  /  Alb  3.7  /  TBili  0.3  /  DBili  x   /  AST  9<L>  /  ALT  15  /  AlkPhos  118  01-07                Radiology Results      Meds    MEDICATIONS  (STANDING):  insulin lispro (ADMELOG) corrective regimen sliding scale   SubCutaneous three times a day before meals  insulin lispro (ADMELOG) corrective regimen sliding scale   SubCutaneous at bedtime  spironolactone 50 milliGRAM(s) Oral daily      MEDICATIONS  (PRN):  acetaminophen     Tablet .. 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

## 2023-01-10 ENCOUNTER — TRANSCRIPTION ENCOUNTER (OUTPATIENT)
Age: 28
End: 2023-01-10

## 2023-01-10 LAB
ALBUMIN SERPL ELPH-MCNC: 3.2 G/DL — LOW (ref 3.5–5)
ALP SERPL-CCNC: 109 U/L — SIGNIFICANT CHANGE UP (ref 40–120)
ALT FLD-CCNC: 13 U/L DA — SIGNIFICANT CHANGE UP (ref 10–60)
ANA TITR SER: NEGATIVE — SIGNIFICANT CHANGE UP
ANION GAP SERPL CALC-SCNC: 8 MMOL/L — SIGNIFICANT CHANGE UP (ref 5–17)
AST SERPL-CCNC: 7 U/L — LOW (ref 10–40)
AT III ACT/NOR PPP CHRO: 87 % — SIGNIFICANT CHANGE UP (ref 85–135)
BILIRUB SERPL-MCNC: 0.3 MG/DL — SIGNIFICANT CHANGE UP (ref 0.2–1.2)
BUN SERPL-MCNC: 16 MG/DL — SIGNIFICANT CHANGE UP (ref 7–18)
CALCIUM SERPL-MCNC: 9.7 MG/DL — SIGNIFICANT CHANGE UP (ref 8.4–10.5)
CHLORIDE SERPL-SCNC: 106 MMOL/L — SIGNIFICANT CHANGE UP (ref 96–108)
CO2 SERPL-SCNC: 25 MMOL/L — SIGNIFICANT CHANGE UP (ref 22–31)
CREAT SERPL-MCNC: 0.9 MG/DL — SIGNIFICANT CHANGE UP (ref 0.5–1.3)
EGFR: 90 ML/MIN/1.73M2 — SIGNIFICANT CHANGE UP
GLUCOSE BLDC GLUCOMTR-MCNC: 100 MG/DL — HIGH (ref 70–99)
GLUCOSE BLDC GLUCOMTR-MCNC: 104 MG/DL — HIGH (ref 70–99)
GLUCOSE BLDC GLUCOMTR-MCNC: 125 MG/DL — HIGH (ref 70–99)
GLUCOSE BLDC GLUCOMTR-MCNC: 152 MG/DL — HIGH (ref 70–99)
GLUCOSE SERPL-MCNC: 119 MG/DL — HIGH (ref 70–99)
HCT VFR BLD CALC: 37.8 % — SIGNIFICANT CHANGE UP (ref 34.5–45)
HGB BLD-MCNC: 12.3 G/DL — SIGNIFICANT CHANGE UP (ref 11.5–15.5)
MCHC RBC-ENTMCNC: 25.5 PG — LOW (ref 27–34)
MCHC RBC-ENTMCNC: 32.5 GM/DL — SIGNIFICANT CHANGE UP (ref 32–36)
MCV RBC AUTO: 78.3 FL — LOW (ref 80–100)
NRBC # BLD: 0 /100 WBCS — SIGNIFICANT CHANGE UP (ref 0–0)
PLATELET # BLD AUTO: 255 K/UL — SIGNIFICANT CHANGE UP (ref 150–400)
POTASSIUM SERPL-MCNC: 4.2 MMOL/L — SIGNIFICANT CHANGE UP (ref 3.5–5.3)
POTASSIUM SERPL-SCNC: 4.2 MMOL/L — SIGNIFICANT CHANGE UP (ref 3.5–5.3)
PROT C ACT/NOR PPP: 100 % — SIGNIFICANT CHANGE UP (ref 74–150)
PROT SERPL-MCNC: 7.2 G/DL — SIGNIFICANT CHANGE UP (ref 6–8.3)
RBC # BLD: 4.83 M/UL — SIGNIFICANT CHANGE UP (ref 3.8–5.2)
RBC # FLD: 13.3 % — SIGNIFICANT CHANGE UP (ref 10.3–14.5)
SODIUM SERPL-SCNC: 139 MMOL/L — SIGNIFICANT CHANGE UP (ref 135–145)
WBC # BLD: 7.69 K/UL — SIGNIFICANT CHANGE UP (ref 3.8–10.5)
WBC # FLD AUTO: 7.69 K/UL — SIGNIFICANT CHANGE UP (ref 3.8–10.5)

## 2023-01-10 RX ADMIN — Medication 1: at 17:36

## 2023-01-10 RX ADMIN — SPIRONOLACTONE 50 MILLIGRAM(S): 25 TABLET, FILM COATED ORAL at 21:27

## 2023-01-10 NOTE — PROGRESS NOTE ADULT - SUBJECTIVE AND OBJECTIVE BOX
Patient is a 27y old  Female who presents with a chief complaint of Abdominal pain (09 Jan 2023 14:37)    pt seen in icu [  ], reg med floor [ x  ], bed [ x ], chair at bedside [   ], a+o x3 [x  ], lethargic [  ],    nad [ x ]        Allergies    No Known Allergies        Vitals    T(F): 97.7 (01-10-23 @ 06:14), Max: 98.9 (01-09-23 @ 20:54)  HR: 59 (01-10-23 @ 06:14) (59 - 87)  BP: 106/49 (01-10-23 @ 06:14) (101/48 - 111/54)  RR: 16 (01-10-23 @ 06:14) (16 - 18)  SpO2: 99% (01-10-23 @ 06:14) (96% - 99%)  Wt(kg): --  CAPILLARY BLOOD GLUCOSE      POCT Blood Glucose.: 94 mg/dL (09 Jan 2023 21:05)      Labs                          Radiology Results      Meds    MEDICATIONS  (STANDING):  insulin lispro (ADMELOG) corrective regimen sliding scale   SubCutaneous three times a day before meals  insulin lispro (ADMELOG) corrective regimen sliding scale   SubCutaneous at bedtime  spironolactone 50 milliGRAM(s) Oral daily      MEDICATIONS  (PRN):  acetaminophen     Tablet .. 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,   leukemia (at age 10, remission 14 years),   pre-DM,   remote h/o acute pancreatitis      PLAN    cont pain control,   heme onc f/u   obtain repeat imaging    f/u ct abd with contrast  pregnancy test before ct   f/u james and hyper coag w/u  vascular cons   stool occult blood,  GI consult   hold ac as chronic splenic thrombus   lispro ss,   hgba1c   cont current meds       Patient is a 27y old  Female who presents with a chief complaint of Abdominal pain (09 Jan 2023 14:37)    pt seen in icu [  ], reg med floor [ x  ], bed [ x ], chair at bedside [   ], a+o x3 [x  ], lethargic [  ],    nad [ x ]    pt states feeling better    Allergies    No Known Allergies        Vitals    T(F): 97.7 (01-10-23 @ 06:14), Max: 98.9 (01-09-23 @ 20:54)  HR: 59 (01-10-23 @ 06:14) (59 - 87)  BP: 106/49 (01-10-23 @ 06:14) (101/48 - 111/54)  RR: 16 (01-10-23 @ 06:14) (16 - 18)  SpO2: 99% (01-10-23 @ 06:14) (96% - 99%)  Wt(kg): --  CAPILLARY BLOOD GLUCOSE      POCT Blood Glucose.: 94 mg/dL (09 Jan 2023 21:05)      Labs    A1C with Estimated Average Glucose (01.09.23 @ 06:35)   A1C with Estimated Average Glucose Result: 5.9:                       Radiology Results      Meds    MEDICATIONS  (STANDING):  insulin lispro (ADMELOG) corrective regimen sliding scale   SubCutaneous three times a day before meals  insulin lispro (ADMELOG) corrective regimen sliding scale   SubCutaneous at bedtime  spironolactone 50 milliGRAM(s) Oral daily      MEDICATIONS  (PRN):  acetaminophen     Tablet .. 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

## 2023-01-10 NOTE — CHART NOTE - NSCHARTNOTEFT_GEN_A_CORE
patient refused by both RN and NP this afternoon  patient aware this is delaying her imaging   requests IV to be place in AM   Vascular to be consulted when imaging results

## 2023-01-10 NOTE — DISCHARGE NOTE PROVIDER - HOSPITAL COURSE
27 year old F with PMH of leukemia (at age 10, remission 14 years), pre-DM, remote h/o acute pancreatitis presents with left sided abdominal pain. Outpatient MRI abdomen 1/3/23 found Absence of the pancreatic neck, body and tail without evidence for a focal pancreatic lesion. Chronic splenic vein thrombosis with upper abdominal venous collaterals, gastric varices and hepatomegaly. Cholelithiasis. Heme onc followed, reccs to hold A/C since thrombus is chronic. Plan to obtain CT Abd, and sonogram. GI Golyan and vascular consults    -*-*-*-* INCOMPLETE 27 year old F with PMH of leukemia (at age 10, remission 14 years), pre-DM, remote h/o acute pancreatitis presents with left sided abdominal pain.   Outpatient MRI abdomen 1/3/23 found Absence of the pancreatic neck, body and tail without evidence for a focal pancreatic lesion.   Chronic splenic vein thrombosis with upper abdominal venous collaterals, gastric varices and hepatomegaly. Cholelithiasis.  Heme onc followed, reccs to hold A/C since thrombus is chronic.  CT Abd obtained:  GI  and vascular consulted. No inpatient intervention recommended  Pt to be discharged home with recommendations to follow up with GI, vascular, hem/onc and hepatologist on outpatient bases

## 2023-01-10 NOTE — PROGRESS NOTE ADULT - PROBLEM SELECTOR PLAN 6
patient form home   pending CT and abd sono   F/U GI and vascular consults
patient form home   pending CT and abd sono   F/U GI and vascular consults

## 2023-01-10 NOTE — DISCHARGE NOTE PROVIDER - CARE PROVIDER_API CALL
Delon Lagos)  Internal Medicine  [address] [place] [address]  Phone: [phone]  Fax: [phone]  Follow Up Time:     Jb Johnson)  Internal Medicine  [address] [place] [address]  Phone: [phone]  Fax: [phone]  Follow Up Time:     Rashid Martinez  INTERNAL MEDICINE  [address] [place] [address]  Phone: [phone]  Fax: [phone]  Follow Up Time:    Delon Lagos)  Internal Medicine  [address] [place] [address]  Phone: [phone]  Fax: [phone]  Follow Up Time:     Jb Johnson)  Internal Medicine  [address] [place] [address]  Phone: [phone]  Fax: [phone]  Follow Up Time:     Rashid Matrinez  INTERNAL MEDICINE  [address] [place] [address]  Phone: [phone]  Fax: [phone]  Follow Up Time:     Omega Jean Baptiste)  Medicine  [address] [facility], [address] [place] [address]  Phone: [phone]  Fax: [phone]  Follow Up Time:

## 2023-01-10 NOTE — PROGRESS NOTE ADULT - SUBJECTIVE AND OBJECTIVE BOX
NP Note discussed with  primary attending    Patient is a 27y old  Female who presents with a chief complaint of Abdominal pain (10 Ryder 2023 06:33)      INTERVAL HPI/OVERNIGHT EVENTS: pending CT and abdominal sono     MEDICATIONS  (STANDING):  insulin lispro (ADMELOG) corrective regimen sliding scale   SubCutaneous three times a day before meals  insulin lispro (ADMELOG) corrective regimen sliding scale   SubCutaneous at bedtime  spironolactone 50 milliGRAM(s) Oral daily    MEDICATIONS  (PRN):  acetaminophen     Tablet .. 650 milliGRAM(s) Oral every 6 hours PRN Temp greater or equal to 38C (100.4F), Mild Pain (1 - 3)      __________________________________________________  REVIEW OF SYSTEMS:    CONSTITUTIONAL: No fever,   EYES: no acute visual disturbances  NECK: No pain or stiffness  RESPIRATORY: No cough; No shortness of breath  CARDIOVASCULAR: No chest pain, no palpitations  GASTROINTESTINAL: +pain. No nausea or vomiting; No diarrhea   NEUROLOGICAL: No headache or numbness, no tremors  MUSCULOSKELETAL: No joint pain, no muscle pain  GENITOURINARY: no dysuria, no frequency, no hesitancy  PSYCHIATRY: no depression , no anxiety  ALL OTHER  ROS negative        Vital Signs Last 24 Hrs  T(C): 36.5 (10 Ryder 2023 06:14), Max: 37.2 (2023 20:54)  T(F): 97.7 (10 Ryder 2023 06:14), Max: 98.9 (2023 20:54)  HR: 59 (10 Ryder 2023 06:14) (59 - 87)  BP: 106/49 (10 Ryder 2023 06:14) (101/48 - 111/54)  BP(mean): 68 (2023 20:54) (68 - 68)  RR: 16 (10 Ryder 2023 06:14) (16 - 18)  SpO2: 99% (10 Ryder 2023 06:14) (96% - 99%)    Parameters below as of 10 Ryder 2023 06:14  Patient On (Oxygen Delivery Method): room air        ________________________________________________  PHYSICAL EXAM:  GENERAL: NAD  HEENT: Normocephalic;  conjunctivae and sclerae clear;   NECK : supple  CHEST/LUNG: Clear to ausculitation bilaterally with good air entry   HEART: S1 S2  regular; no murmurs, gallops or rubs  ABDOMEN: Soft, Nontender, Nondistended; Bowel sounds present  EXTREMITIES: no cyanosis; no edema; no calf tenderness  SKIN: warm and dry; no rash  NERVOUS SYSTEM:  Awake and alert; Oriented  to place, person and time     _________________________________________________  LABS:                        12.3   7.69  )-----------( 255      ( 10 Ryder 2023 07:23 )             37.8     01-10    139  |  106  |  16  ----------------------------<  119<H>  4.2   |  25  |  0.90    Ca    9.7      10 Ryder 2023 07:23    TPro  7.2  /  Alb  3.2<L>  /  TBili  0.3  /  DBili  x   /  AST  7<L>  /  ALT  13  /  AlkPhos  109  01-10      Urinalysis Basic - ( 2023 10:15 )    Color: Yellow / Appearance: Clear / S.025 / pH: x  Gluc: x / Ketone: Negative  / Bili: Negative / Urobili: Negative   Blood: x / Protein: 30 mg/dL / Nitrite: Negative   Leuk Esterase: Negative / RBC: 2-5 /HPF / WBC 0-2 /HPF   Sq Epi: x / Non Sq Epi: Few /HPF / Bacteria: Trace /HPF      CAPILLARY BLOOD GLUCOSE      POCT Blood Glucose.: 100 mg/dL (10 Ryder 2023 07:45)  POCT Blood Glucose.: 94 mg/dL (2023 21:05)  POCT Blood Glucose.: 76 mg/dL (2023 16:25)  POCT Blood Glucose.: 109 mg/dL (2023 11:19)        Consultant(s) Notes Reviewed:   YES/        Plan of care was discussed with patient and /or primary care giver; all questions and concerns were addressed and care was aligned with patient's wishes.

## 2023-01-10 NOTE — PROGRESS NOTE ADULT - PROBLEM SELECTOR PLAN 1
recent CT/MRI from 1/3/23 show absences of pancreatic neck, body and tail as well as chronic splenic vein thrombosis with upper abdominal collaterals, gastric varices and hepatomegaly. Also showed cholelithiasis without ductal dilatation.  - heme onc Dr. Martinez  - reccs to hold A/C given thrombus is chronic   F/U repeat inpatient imaging   GI Ita consulted  consult vascular once imaging results

## 2023-01-10 NOTE — DISCHARGE NOTE PROVIDER - PROVIDER TOKENS
PROVIDER:[TOKEN:[[id number]]],PROVIDER:[TOKEN:[[id number]]],PROVIDER:[TOKEN:[[id number]]] PROVIDER:[TOKEN:[[id number]]],PROVIDER:[TOKEN:[[id number]]],PROVIDER:[TOKEN:[[id number]]],PROVIDER:[TOKEN:[[id number]]]

## 2023-01-10 NOTE — PROGRESS NOTE ADULT - ASSESSMENT
27 year old F with PMH of leukemia (at age 10, remission 14 years), pre-DM, remote h/o acute pancreatitis presents with left sided abdominal pain. Outpatient MRI abdomen 1/3/23 found Absence of the pancreatic neck, body and tail without evidence for a focal pancreatic lesion. Chronic splenic vein thrombosis with upper abdominal venous collaterals, gastric varices and hepatomegaly. Cholelithiasis. Heme onc followed, reccs to hold A/C since thrombus is chronic. Plan to obtain CT Abd, and sonogram. GI Golyan and vascular consults

## 2023-01-10 NOTE — DISCHARGE NOTE PROVIDER - NSDCCPCAREPLAN_GEN_ALL_CORE_FT
PRINCIPAL DISCHARGE DIAGNOSIS  Diagnosis: Splenic vein thrombosis  Assessment and Plan of Treatment: Splenic vein thrombosis is a blood clot that is obstructing the splenic vein, which is located on the surface of the spleen. The increased pressure in the splenic vein causes the spleen to enlarge, and other veins to dilate and twist in the esophagus and stomach. This was found to be chronic and did not require further treatment *-*-*   Heme onc followed and reccomended *-*-*      SECONDARY DISCHARGE DIAGNOSES  Diagnosis: Cholelithiasis  Assessment and Plan of Treatment: Gallstones are hard substances that form in your gallbladder or bile duct. Your gallbladder and bile duct are located on the right side of your abdomen, near your liver. Your gallbladder stores bile. Bile helps break down the fat that you eat. Your gallbladder also helps remove certain chemicals from your body. Take your medicine as directed. Contact your healthcare provider if you think your medicine is not helping or if you have side effects. Tell your provider if you are allergic to any medicine. Keep a list of the medicines, vitamins, and herbs you take. Include the amounts, and when and why you take them. Bring the list or the pill bottles to follow-up visits. Eat a variety of healthy foods. This may help you have more energy and heal faster. Healthy foods include fruits, vegetables, whole-grain breads, low-fat dairy products, beans, lean meat, and fish. Ask if you need to be on a special diet. Try to eat regular meals during the day. This will help your gallbladder empty. Exercise as directed. Exercise can help you lose weight and improve your health.  Manage your weight. If you are overweight, it is important to reach a healthy weight. You will need to lose weight slowly because rapid weight loss can increase your risk for gallstones.     PRINCIPAL DISCHARGE DIAGNOSIS  Diagnosis: Splenic vein thrombosis  Assessment and Plan of Treatment: Splenic vein thrombosis is a blood clot that is obstructing the splenic vein, which is located on the surface of the spleen. The increased pressure in the splenic vein causes the spleen to enlarge, and other veins to dilate and twist in the esophagus and stomach. This was found to be chronic and did not require further treatment   Heme onc followed and reccomended outpatietn follow up with hematology   Follow up with Dr Martinez for reevaluation and monitoring to ensure stability   Also follow up with hepatologist Dr Johnson for further managment      SECONDARY DISCHARGE DIAGNOSES  Diagnosis: Cholelithiasis  Assessment and Plan of Treatment: Gallstones are hard substances that form in your gallbladder or bile duct. Your gallbladder and bile duct are located on the right side of your abdomen, near your liver. Your gallbladder stores bile. Bile helps break down the fat that you eat. Your gallbladder also helps remove certain chemicals from your body. Take your medicine as directed. Contact your healthcare provider if you think your medicine is not helping or if you have side effects. Tell your provider if you are allergic to any medicine. Keep a list of the medicines, vitamins, and herbs you take. Include the amounts, and when and why you take them. Bring the list or the pill bottles to follow-up visits. Eat a variety of healthy foods. This may help you have more energy and heal faster. Healthy foods include fruits, vegetables, whole-grain breads, low-fat dairy products, beans, lean meat, and fish. Ask if you need to be on a special diet. Try to eat regular meals during the day. This will help your gallbladder empty. Exercise as directed. Exercise can help you lose weight and improve your health.  Manage your weight. If you are overweight, it is important to reach a healthy weight. You will need to lose weight slowly because rapid weight loss can increase your risk for gallstones.

## 2023-01-11 ENCOUNTER — TRANSCRIPTION ENCOUNTER (OUTPATIENT)
Age: 28
End: 2023-01-11

## 2023-01-11 VITALS
SYSTOLIC BLOOD PRESSURE: 112 MMHG | HEART RATE: 84 BPM | DIASTOLIC BLOOD PRESSURE: 78 MMHG | OXYGEN SATURATION: 96 % | RESPIRATION RATE: 17 BRPM | TEMPERATURE: 98 F

## 2023-01-11 LAB
APCR PPP: 2.47 RATIO — SIGNIFICANT CHANGE UP
B2 GLYCOPROT1 AB SER QL: NEGATIVE — SIGNIFICANT CHANGE UP
CARDIOLIPIN AB SER-ACNC: NEGATIVE — SIGNIFICANT CHANGE UP
D DIMER BLD IA.RAPID-MCNC: 186 NG/ML DDU — SIGNIFICANT CHANGE UP
GLUCOSE BLDC GLUCOMTR-MCNC: 106 MG/DL — HIGH (ref 70–99)
GLUCOSE BLDC GLUCOMTR-MCNC: 172 MG/DL — HIGH (ref 70–99)
GLUCOSE BLDC GLUCOMTR-MCNC: 77 MG/DL — SIGNIFICANT CHANGE UP (ref 70–99)

## 2023-01-11 PROCEDURE — 86147 CARDIOLIPIN ANTIBODY EA IG: CPT

## 2023-01-11 PROCEDURE — 85027 COMPLETE CBC AUTOMATED: CPT

## 2023-01-11 PROCEDURE — 96374 THER/PROPH/DIAG INJ IV PUSH: CPT

## 2023-01-11 PROCEDURE — 74160 CT ABDOMEN W/CONTRAST: CPT | Mod: 26

## 2023-01-11 PROCEDURE — 82962 GLUCOSE BLOOD TEST: CPT

## 2023-01-11 PROCEDURE — 74160 CT ABDOMEN W/CONTRAST: CPT

## 2023-01-11 PROCEDURE — 85307 ASSAY ACTIVATED PROTEIN C: CPT

## 2023-01-11 PROCEDURE — 85379 FIBRIN DEGRADATION QUANT: CPT

## 2023-01-11 PROCEDURE — 85300 ANTITHROMBIN III ACTIVITY: CPT

## 2023-01-11 PROCEDURE — 85303 CLOT INHIBIT PROT C ACTIVITY: CPT

## 2023-01-11 PROCEDURE — 99285 EMERGENCY DEPT VISIT HI MDM: CPT | Mod: 25

## 2023-01-11 PROCEDURE — 80053 COMPREHEN METABOLIC PANEL: CPT

## 2023-01-11 PROCEDURE — 87637 SARSCOV2&INF A&B&RSV AMP PRB: CPT

## 2023-01-11 PROCEDURE — 85306 CLOT INHIBIT PROT S FREE: CPT

## 2023-01-11 PROCEDURE — 36415 COLL VENOUS BLD VENIPUNCTURE: CPT

## 2023-01-11 PROCEDURE — 85025 COMPLETE CBC W/AUTO DIFF WBC: CPT

## 2023-01-11 PROCEDURE — 81001 URINALYSIS AUTO W/SCOPE: CPT

## 2023-01-11 PROCEDURE — 86038 ANTINUCLEAR ANTIBODIES: CPT

## 2023-01-11 PROCEDURE — 83690 ASSAY OF LIPASE: CPT

## 2023-01-11 PROCEDURE — 81240 F2 GENE: CPT

## 2023-01-11 PROCEDURE — 99221 1ST HOSP IP/OBS SF/LOW 40: CPT

## 2023-01-11 PROCEDURE — 81025 URINE PREGNANCY TEST: CPT

## 2023-01-11 PROCEDURE — 81241 F5 GENE: CPT

## 2023-01-11 PROCEDURE — 83036 HEMOGLOBIN GLYCOSYLATED A1C: CPT

## 2023-01-11 PROCEDURE — 86146 BETA-2 GLYCOPROTEIN ANTIBODY: CPT

## 2023-01-11 RX ADMIN — Medication 1: at 11:36

## 2023-01-11 NOTE — DISCHARGE NOTE NURSING/CASE MANAGEMENT/SOCIAL WORK - NSDCPEFALRISK_GEN_ALL_CORE
For information on Fall & Injury Prevention, visit: [email or web address] OR  [email or web address] OR  https://www.cdc.gov/steadi/patient.html

## 2023-01-11 NOTE — PROGRESS NOTE ADULT - ASSESSMENT
· Assessment	  27 year old F with PMH of leukemia (at age 10, remission 14 years), pre-DM, remote h/o acute pancreatitis presents with left sided abdominal pain for3  days.    ALL   - follows with Dr Martinez   - in remission       Abd pain   - pt with abd pain for about 3 days   - pt had imaging at [place] [facility] by her GI before having abd pain   - MRI from 1/3/23 as outpt:  Absence of the pancreatic neck, body and tail without evidence for a focal pancreatic lesion. Similar findings of chronic splenic vein thrombosis with upper abdominal venous collaterals, gastric varices and hepatomegaly. Cholelithiasis. No ductal dilatation.   - GI consult   - vascular eval  - hold ac as chronic splenic thrombus. Cause unclear, due to underlying sickle cell trait vs hypercoag state vs vasculitis, or previous pancreatitis while receiving chemo for ALL  will do d-dimer  the pain is more of pressure like with stabbing at times, somewhat pleuritic.  for CT scan    d/w pt and primary team, will follow

## 2023-01-11 NOTE — CONSULT NOTE ADULT - SUBJECTIVE AND OBJECTIVE BOX
Reason for consult: History of ALL     HPI:  27 year old F with PMH of leukemia (at age 10, remission 14 years), pre-DM, remote h/o acute pancreatitis presents with left sided abdominal pain for3  days. Patient states she has had left upper quadrant sided 7/10 stabbing-pressure-like pain to the back associated with nausea. Patient denies any vomiting or diarrhea, and states the pain has improved to 3/10 since getting morphine in ED. She reports that she had pancreatitis when she was 14 years old while she had leukemia. States she was seen by endocrinology recently for elevated blood glucose, and underwent CT abdomen prior to starting ozempic to evaluate for any pancreatic issues. 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 Patient was referred to GI and underwent MR abdomen which confirmed absences of pancreatic neck, body and tail as well as chronic splenic vein thrombosis with upper abdominal collaterals, gastric varices and hepatomegaly. Also showed cholelithiasis without ductal dilatation.  States she was on OCP from 2016 until 1 year ago.     ED Course:  Vitals:  135/79 P 99 R 18 T 98.2 F SpO2 99% RA  Meds: morphine 4 mg (08 Jan 2023 04:36)    Seen at bedside. states pain is about 3 now. otherwise comfortable     PAST MEDICAL & SURGICAL HISTORY:  Leukemia          FAMILY HISTORY:      Alochol: Denied  Smoking: Nonsmoker  Drug Use: Denied  Marital Status:         Allergies    No Known Allergies    Intolerances        MEDICATIONS  (STANDING):  insulin lispro (ADMELOG) corrective regimen sliding scale   SubCutaneous three times a day before meals  insulin lispro (ADMELOG) corrective regimen sliding scale   SubCutaneous at bedtime  spironolactone 50 milliGRAM(s) Oral daily    MEDICATIONS  (PRN):  acetaminophen     Tablet .. 650 milliGRAM(s) Oral every 6 hours PRN Temp greater or equal to 38C (100.4F), Mild Pain (1 - 3)      ROS:     General:  No wt loss, fevers, chills, night sweats, fatigue,   Eyes:  Good vision, no reported pain  ENT:  No sore throat, pain, runny nose, dysphagia  CV:  No pain, palpitations, hypo/hypertension  Resp:  No dyspnea, cough, tachypnea, wheezing  GI:  + abd pain   :  No pain, bleeding, incontinence, nocturia  Muscle:  No pain, weakness  Neuro:  No weakness, tingling, memory problems  Psych:  No fatigue, insomnia, mood problems, depression  Endocrine:  No polyuria, polydipsia, cold/heat intolerance  Heme:  No petechiae, ecchymosis, easy bruisability  Skin:  No rash, tattoos, scars, edema      PHYSICAL EXAM:     GENERAL:  Appears stated age, well-groomed  HEENT:  NC/AT,  conjunctivae clear and pink  CHEST:  Full & symmetric excursion, no increased effort, breath sounds clear  HEART:  Regular rhythm, S1, S2, no murmur/rub/S3/S4  ABDOMEN:  Soft,minimal tenderess in LUQ   EXTEREMITIES:  no cyanosis,clubbing or edema  SKIN:  No rash/erythema/ecchymoses  NEURO:  Alert, oriented, no asterixis  LN: no palpable Lymphadenopathy                           13.4   13.00 )-----------( 314      ( 07 Jan 2023 22:00 )             40.2       01-07    141  |  104  |  17  ----------------------------<  113<H>  3.6   |  25  |  0.90    Ca    9.5      07 Jan 2023 22:00    TPro  7.9  /  Alb  3.7  /  TBili  0.3  /  DBili  x   /  AST  9<L>  /  ALT  15  /  AlkPhos  118  01-07  
Patient is a 27y old  Female who presents with a chief complaint of Abdominal pain (11 Jan 2023 09:17)      HPI  Called see and eval 27y.o. Female w/PMH significant for leukemia on remission, chronic pancreatitis for splenic vein thrombosis. Pt admitted 1/7/23 c/o L sided abd pain. Pt has been seeing a gastroenterologist Dr. Morgan as outpt, and referred pt to heme/onc and vascular surgeon. Vascular surgeon was Dr. Astudillo, and pt was told he is not a splenic vein specialist. Pt also had CT and MRI as outpt which showed splenic vein thrombosis. CT abd/pelvis inpatient did not show splenic vein clearly, with gastric varices which is consistent with splenic vein thrombosis. Pt still with mild LUQ pain, tolerating diet well. Pt awaiting discharge.     PAST MEDICAL & SURGICAL HISTORY:  Leukemia    MEDICATIONS  (STANDING):  insulin lispro (ADMELOG) corrective regimen sliding scale   SubCutaneous three times a day before meals  insulin lispro (ADMELOG) corrective regimen sliding scale   SubCutaneous at bedtime  spironolactone 50 milliGRAM(s) Oral daily    MEDICATIONS  (PRN):  acetaminophen     Tablet .. 650 milliGRAM(s) Oral every 6 hours PRN Temp greater or equal to 38C (100.4F), Mild Pain (1 - 3)    Allergies    No Known Allergies    Intolerances    Vital Signs Last 24 Hrs  T(C): 36.7 (11 Jan 2023 12:25), Max: 37 (10 Ryder 2023 22:00)  T(F): 98.1 (11 Jan 2023 12:25), Max: 98.6 (10 Ryder 2023 22:00)  HR: 84 (11 Jan 2023 12:25) (64 - 88)  BP: 112/78 (11 Jan 2023 12:25) (101/65 - 112/78)  BP(mean): --  RR: 17 (11 Jan 2023 12:25) (17 - 17)  SpO2: 96% (11 Jan 2023 12:25) (96% - 100%)    Parameters below as of 11 Jan 2023 12:25  Patient On (Oxygen Delivery Method): room air    Physical:  Gen: A&Ox3. NAD  Abd: Soft ND, NT    LABS:                        12.3   7.69  )-----------( 255      ( 10 Ryder 2023 07:23 )             37.8              01-10    139  |  106  |  16  ----------------------------<  119<H>  4.2   |  25  |  0.90    Ca    9.7      10 Jan 2023 07:23    TPro  7.2  /  Alb  3.2<L>  /  TBili  0.3  /  DBili  x   /  AST  7<L>  /  ALT  13  /  AlkPhos  109  01-10              RADIOLOGY & ADDITIONAL STUDIES:  < from: CT Abdomen w/ IV Cont (01.11.23 @ 12:06) >  LIVER: Within normal limits.  BILE DUCTS: Normal caliber.  GALLBLADDER: Within normal limits.  SPLEEN: Within normal limits.  PANCREAS: Truncated pancreas with absence or atrophy of the neck, body   and tail.  ADRENALS: Within normallimits.  KIDNEYS/URETERS: Within normal limits.    VISUALIZED PORTIONS:  BOWEL: Within normal limits.  PERITONEUM: No ascites.  VESSELS: Nonvisualization of the splenic vein. Gastroesophageal and left   gastric varices.  RETROPERITONEUM/LYMPH NODES: No lymphadenopathy.  ABDOMINAL WALL: Tiny fat-containing umbilical hernia.    < end of copied text >

## 2023-01-11 NOTE — CONSULT NOTE ADULT - ASSESSMENT
27 year old F with PMH of leukemia (at age 10, remission 14 years), pre-DM, remote h/o acute pancreatitis presents with left sided abdominal pain for3  days.    ALL   - follows with Dr Martinez   - in remission       Abd pain   - pt with abd pain for about 3 days   - pt had imaging at [place] [facility] by her GI before having abd pain   - MRI from 1/3/23  Absence of the pancreatic neck, body and tail without evidence for a focal pancreatic lesion.  2. Similar findings of chronic splenic vein thrombosis with upper abdominal venous collaterals,  gastric varices and hepatomegaly.  3. Cholelithiasis. No ductal dilatation.   - would obtain repeat imaging   - GI consult   - hold ac as chronic splenic thrombus   - d/w Dr Martinez who is agreement       Raul Mccain MD  HematologyOncology   O: [phone] 
27y.o. Female with splenic vein thrombosis, hx pancreatitis, leukemia    -No vascular surgery indicated at present time  -Recommend GI f/u before discharge  -Splenectomy if pt has severe pain  -No need for AC

## 2023-01-11 NOTE — DISCHARGE NOTE NURSING/CASE MANAGEMENT/SOCIAL WORK - PATIENT PORTAL LINK FT
[address] [facility], [place] [address] You can access the FollowMyHealth Patient Portal offered by [facility] by registering at the following website: [email or web address]. By joining [employer]’s FollowMyHealth portal, you will also be able to view your health information using other applications (apps) compatible with our system.

## 2023-01-11 NOTE — PROGRESS NOTE ADULT - PROVIDER SPECIALTY LIST ADULT
Internal Medicine
Internal Medicine
Heme/Onc
Internal Medicine
Heme/Onc
Internal Medicine
Internal Medicine

## 2023-01-11 NOTE — PROGRESS NOTE ADULT - SUBJECTIVE AND OBJECTIVE BOX
Patient is a 27y old  Female who presents with a chief complaint of Abdominal pain (10 Ryder 2023 18:50)    pt seen in icu [  ], reg med floor [ x  ], bed [ x ], chair at bedside [   ], a+o x3 [x  ], lethargic [  ],    nad [ x ]        Allergies    No Known Allergies        Vitals    T(F): 98.2 (01-11-23 @ 05:15), Max: 98.6 (01-10-23 @ 22:00)  HR: 64 (01-11-23 @ 05:15) (64 - 88)  BP: 109/51 (01-11-23 @ 05:15) (101/65 - 116/69)  RR: 17 (01-11-23 @ 05:15) (16 - 17)  SpO2: 100% (01-11-23 @ 05:15) (98% - 100%)  Wt(kg): --  CAPILLARY BLOOD GLUCOSE      POCT Blood Glucose.: 125 mg/dL (10 Ryder 2023 21:26)      Labs                          12.3   7.69  )-----------( 255      ( 10 Ryder 2023 07:23 )             37.8       01-10    139  |  106  |  16  ----------------------------<  119<H>  4.2   |  25  |  0.90    Ca    9.7      10 Ryder 2023 07:23    TPro  7.2  /  Alb  3.2<L>  /  TBili  0.3  /  DBili  x   /  AST  7<L>  /  ALT  13  /  AlkPhos  109  01-10                Radiology Results      Meds    MEDICATIONS  (STANDING):  insulin lispro (ADMELOG) corrective regimen sliding scale   SubCutaneous three times a day before meals  insulin lispro (ADMELOG) corrective regimen sliding scale   SubCutaneous at bedtime  spironolactone 50 milliGRAM(s) Oral daily      MEDICATIONS  (PRN):  acetaminophen     Tablet .. 650 milliGRAM(s) Oral every 6 hours PRN Temp greater or equal to 38C (100.4F), Mild Pain (1 - 3)      Physical Exam    Neuro :  no focal deficits  Respiratory: CTA B/L  CV: RRR, S1S2, no murmurs,   Abdominal: Soft, mild luq tender to deep palp, ND +BS,  Extremities: No edema, + peripheral pulses      ASSESSMENT    abd pain,   h/o chronic splenic vein occlusion with isolated left gastric varices,   leukemia (at age 10, remission 14 years),   pre-DM,   remote h/o acute pancreatitis      PLAN    cont pain control,   heme onc f/u   obtain repeat imaging    f/u ct abd with contrast  pregnancy test neg   f/u james and hyper coag w/u  vascular cons   stool occult blood,  GI consult   hold ac as chronic splenic thrombus   lispro ss,   hgba1c 5.9 noted above   cont current meds         Patient is a 27y old  Female who presents with a chief complaint of Abdominal pain (10 Ryder 2023 18:50)    pt seen in icu [  ], reg med floor [ x  ], bed [ x ], chair at bedside [   ], a+o x3 [x  ], lethargic [  ],    nad [ x ]    pt states pain is much less    Allergies    No Known Allergies        Vitals    T(F): 98.2 (01-11-23 @ 05:15), Max: 98.6 (01-10-23 @ 22:00)  HR: 64 (01-11-23 @ 05:15) (64 - 88)  BP: 109/51 (01-11-23 @ 05:15) (101/65 - 116/69)  RR: 17 (01-11-23 @ 05:15) (16 - 17)  SpO2: 100% (01-11-23 @ 05:15) (98% - 100%)  Wt(kg): --  CAPILLARY BLOOD GLUCOSE      POCT Blood Glucose.: 125 mg/dL (10 Ryder 2023 21:26)      Labs                          12.3   7.69  )-----------( 255      ( 10 Ryder 2023 07:23 )             37.8       01-10    139  |  106  |  16  ----------------------------<  119<H>  4.2   |  25  |  0.90    Ca    9.7      10 Ryder 2023 07:23    TPro  7.2  /  Alb  3.2<L>  /  TBili  0.3  /  DBili  x   /  AST  7<L>  /  ALT  13  /  AlkPhos  109  01-10    Anticardiolipin Antibody Level, Total (01.09.23 @ 12:16)   Anticardiolipin Antibody Level, Total: Negative:   Beta 2 Glycoprotein 1 Antibody Screen (01.09.23 @ 12:16)   Beta 2 Glycoprotein 1 Antibody Screen: Negative  Activated Protein C Resistance Assay (01.09.23 @ 12:16)   Activated Protein C Resistance Ratio: 2.47:  Protein C Functional Assay with Reflex (01.09.23 @ 12:16)   Protein C Functional Assay with Reflex: 100:  Antithrombin III Assay with Reflex (01.09.23 @ 12:16)   Antithrombin III Assay with Reflex: 87    Anti-Nuclear Antibody (01.09.23 @ 12:16)   Anti Nuclear Factor Titer: Negative:        Radiology Results      Meds    MEDICATIONS  (STANDING):  insulin lispro (ADMELOG) corrective regimen sliding scale   SubCutaneous three times a day before meals  insulin lispro (ADMELOG) corrective regimen sliding scale   SubCutaneous at bedtime  spironolactone 50 milliGRAM(s) Oral daily      MEDICATIONS  (PRN):  acetaminophen     Tablet .. 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 Cause unclear, due to underlying sickle cell trait    vascular cons pending ct results   stool occult blood,  hepatology consult outpt   hold ac as chronic splenic thrombus   lispro ss,   hgba1c 5.9 noted   cont current meds

## 2023-01-11 NOTE — PROGRESS NOTE ADULT - SUBJECTIVE AND OBJECTIVE BOX
HPI:  27 year old F with PMH of leukemia (at age 10, remission 14 years), pre-DM, remote h/o acute pancreatitis presents with left sided abdominal pain for3  days. Patient states she has had left upper quadrant sided 7/10 stabbing-pressure-like pain to the back associated with nausea. Patient denies any vomiting or diarrhea, and states the pain has improved to 3/10 since getting morphine in ED. She reports that she had pancreatitis when she was 14 years old while she had leukemia. States she was seen by endocrinology recently for elevated blood glucose, and underwent CT abdomen prior to starting ozempic to evaluate for any pancreatic issues. 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 Patient was referred to GI and underwent MR abdomen which confirmed absences of pancreatic neck, body and tail as well as chronic splenic vein thrombosis with upper abdominal collaterals, gastric varices and hepatomegaly. Also showed cholelithiasis without ductal dilatation.  States she was on OCP from 2016 until 1 year ago.     ED Course:  Vitals:  135/79 P 99 R 18 T 98.2 F SpO2 99% RA  Meds: morphine 4 mg (08 Jan 2023 04:36)     Pt is seen and examined  pt is awake and lying in bed/out of bed to chair  pt seems comfortable and denies any complaints at this time    ROS:  Negative except for:    MEDICATIONS  (STANDING):  insulin lispro (ADMELOG) corrective regimen sliding scale   SubCutaneous three times a day before meals  insulin lispro (ADMELOG) corrective regimen sliding scale   SubCutaneous at bedtime  spironolactone 50 milliGRAM(s) Oral daily    MEDICATIONS  (PRN):  acetaminophen     Tablet .. 650 milliGRAM(s) Oral every 6 hours PRN Temp greater or equal to 38C (100.4F), Mild Pain (1 - 3)      Allergies    No Known Allergies    Intolerances        Vital Signs Last 24 Hrs  T(C): 36.8 (11 Jan 2023 05:15), Max: 37 (10 Ryder 2023 22:00)  T(F): 98.2 (11 Jan 2023 05:15), Max: 98.6 (10 Ryder 2023 22:00)  HR: 64 (11 Jan 2023 05:15) (64 - 88)  BP: 109/51 (11 Jan 2023 05:15) (101/65 - 116/69)  BP(mean): --  RR: 17 (11 Jan 2023 05:15) (16 - 17)  SpO2: 100% (11 Jan 2023 05:15) (98% - 100%)    Parameters below as of 11 Jan 2023 05:15  Patient On (Oxygen Delivery Method): room air        PHYSICAL EXAM  General: adult in NAD  HEENT: clear oropharynx, anicteric sclera, pink conjunctiva  Neck: supple  CV: normal S1/S2 with no murmur rubs or gallops  Lungs: positive air movement b/l ant lungs,clear to auscultation, no wheezes, no rales  Abdomen: soft non-tender non-distended, no hepatosplenomegaly  Ext: no clubbing cyanosis or edema  Skin: no rashes and no petechiae  Neuro: alert and oriented X 4, no focal deficits  LABS:                          12.3   7.69  )-----------( 255      ( 10 Ryder 2023 07:23 )             37.8         Mean Cell Volume : 78.3 fl  Mean Cell Hemoglobin : 25.5 pg  Mean Cell Hemoglobin Concentration : 32.5 gm/dL  Auto Neutrophil # : x  Auto Lymphocyte # : x  Auto Monocyte # : x  Auto Eosinophil # : x  Auto Basophil # : x  Auto Neutrophil % : x  Auto Lymphocyte % : x  Auto Monocyte % : x  Auto Eosinophil % : x  Auto Basophil % : x    Serial CBC  Hematocrit 37.8  Hemoglobin 12.3  Plat 255  RBC 4.83  WBC 7.69  Serial CBC  Hematocrit 40.2  Hemoglobin 13.4  Plat 314  RBC 5.18  WBC 13.00    01-10    139  |  106  |  16  ----------------------------<  119<H>  4.2   |  25  |  0.90    Ca    9.7      10 Ryder 2023 07:23    TPro  7.2  /  Alb  3.2<L>  /  TBili  0.3  /  DBili  x   /  AST  7<L>  /  ALT  13  /  AlkPhos  109  01-10                    BLOOD SMEAR INTERPRETATION:       RADIOLOGY & ADDITIONAL STUDIES:

## 2023-01-12 LAB
DNA PLOIDY SPEC FC-IMP: SIGNIFICANT CHANGE UP
PROT S FREE PPP-ACNC: 75 % — SIGNIFICANT CHANGE UP (ref 63–140)
PTR INTERPRETATION: SIGNIFICANT CHANGE UP

## 2023-02-06 ENCOUNTER — APPOINTMENT (OUTPATIENT)
Dept: HEPATOLOGY | Facility: CLINIC | Age: 28
End: 2023-02-06

## 2023-04-14 ENCOUNTER — EMERGENCY (EMERGENCY)
Facility: HOSPITAL | Age: 28
LOS: 1 days | Discharge: HOME | End: 2023-04-14
Attending: STUDENT IN AN ORGANIZED HEALTH CARE EDUCATION/TRAINING PROGRAM | Admitting: STUDENT IN AN ORGANIZED HEALTH CARE EDUCATION/TRAINING PROGRAM
Payer: MEDICAID

## 2023-04-14 VITALS
HEART RATE: 98 BPM | WEIGHT: 199.96 LBS | SYSTOLIC BLOOD PRESSURE: 113 MMHG | HEIGHT: 62 IN | RESPIRATION RATE: 17 BRPM | OXYGEN SATURATION: 99 % | TEMPERATURE: 98 F | DIASTOLIC BLOOD PRESSURE: 65 MMHG

## 2023-04-14 LAB
ALBUMIN SERPL ELPH-MCNC: 3.3 G/DL — LOW (ref 3.5–5)
ALP SERPL-CCNC: 91 U/L — SIGNIFICANT CHANGE UP (ref 40–120)
ALT FLD-CCNC: 13 U/L DA — SIGNIFICANT CHANGE UP (ref 10–60)
ANION GAP SERPL CALC-SCNC: 5 MMOL/L — SIGNIFICANT CHANGE UP (ref 5–17)
APPEARANCE UR: CLEAR — SIGNIFICANT CHANGE UP
AST SERPL-CCNC: 7 U/L — LOW (ref 10–40)
BASOPHILS # BLD AUTO: 0.03 K/UL — SIGNIFICANT CHANGE UP (ref 0–0.2)
BASOPHILS NFR BLD AUTO: 0.3 % — SIGNIFICANT CHANGE UP (ref 0–2)
BILIRUB SERPL-MCNC: 0.2 MG/DL — SIGNIFICANT CHANGE UP (ref 0.2–1.2)
BILIRUB UR-MCNC: NEGATIVE — SIGNIFICANT CHANGE UP
BLD GP AB SCN SERPL QL: SIGNIFICANT CHANGE UP
BUN SERPL-MCNC: 9 MG/DL — SIGNIFICANT CHANGE UP (ref 7–18)
CALCIUM SERPL-MCNC: 9.2 MG/DL — SIGNIFICANT CHANGE UP (ref 8.4–10.5)
CHLORIDE SERPL-SCNC: 106 MMOL/L — SIGNIFICANT CHANGE UP (ref 96–108)
CO2 SERPL-SCNC: 24 MMOL/L — SIGNIFICANT CHANGE UP (ref 22–31)
COLOR SPEC: YELLOW — SIGNIFICANT CHANGE UP
CREAT SERPL-MCNC: 0.72 MG/DL — SIGNIFICANT CHANGE UP (ref 0.5–1.3)
DIFF PNL FLD: NEGATIVE — SIGNIFICANT CHANGE UP
EGFR: 117 ML/MIN/1.73M2 — SIGNIFICANT CHANGE UP
EOSINOPHIL # BLD AUTO: 0.11 K/UL — SIGNIFICANT CHANGE UP (ref 0–0.5)
EOSINOPHIL NFR BLD AUTO: 1 % — SIGNIFICANT CHANGE UP (ref 0–6)
GLUCOSE SERPL-MCNC: 159 MG/DL — HIGH (ref 70–99)
GLUCOSE UR QL: NEGATIVE — SIGNIFICANT CHANGE UP
HCG SERPL-ACNC: HIGH MIU/ML
HCT VFR BLD CALC: 37.9 % — SIGNIFICANT CHANGE UP (ref 34.5–45)
HGB BLD-MCNC: 12.8 G/DL — SIGNIFICANT CHANGE UP (ref 11.5–15.5)
IMM GRANULOCYTES NFR BLD AUTO: 0.3 % — SIGNIFICANT CHANGE UP (ref 0–0.9)
KETONES UR-MCNC: NEGATIVE — SIGNIFICANT CHANGE UP
LEUKOCYTE ESTERASE UR-ACNC: NEGATIVE — SIGNIFICANT CHANGE UP
LIDOCAIN IGE QN: 52 U/L — LOW (ref 73–393)
LYMPHOCYTES # BLD AUTO: 19.3 % — SIGNIFICANT CHANGE UP (ref 13–44)
LYMPHOCYTES # BLD AUTO: 2.16 K/UL — SIGNIFICANT CHANGE UP (ref 1–3.3)
MCHC RBC-ENTMCNC: 25.9 PG — LOW (ref 27–34)
MCHC RBC-ENTMCNC: 33.8 GM/DL — SIGNIFICANT CHANGE UP (ref 32–36)
MCV RBC AUTO: 76.7 FL — LOW (ref 80–100)
MONOCYTES # BLD AUTO: 0.56 K/UL — SIGNIFICANT CHANGE UP (ref 0–0.9)
MONOCYTES NFR BLD AUTO: 5 % — SIGNIFICANT CHANGE UP (ref 2–14)
NEUTROPHILS # BLD AUTO: 8.32 K/UL — HIGH (ref 1.8–7.4)
NEUTROPHILS NFR BLD AUTO: 74.1 % — SIGNIFICANT CHANGE UP (ref 43–77)
NITRITE UR-MCNC: NEGATIVE — SIGNIFICANT CHANGE UP
NRBC # BLD: 0 /100 WBCS — SIGNIFICANT CHANGE UP (ref 0–0)
PH UR: 6 — SIGNIFICANT CHANGE UP (ref 5–8)
PLATELET # BLD AUTO: 246 K/UL — SIGNIFICANT CHANGE UP (ref 150–400)
POTASSIUM SERPL-MCNC: 3.6 MMOL/L — SIGNIFICANT CHANGE UP (ref 3.5–5.3)
POTASSIUM SERPL-SCNC: 3.6 MMOL/L — SIGNIFICANT CHANGE UP (ref 3.5–5.3)
PROT SERPL-MCNC: 7.4 G/DL — SIGNIFICANT CHANGE UP (ref 6–8.3)
PROT UR-MCNC: NEGATIVE — SIGNIFICANT CHANGE UP
RBC # BLD: 4.94 M/UL — SIGNIFICANT CHANGE UP (ref 3.8–5.2)
RBC # FLD: 14.2 % — SIGNIFICANT CHANGE UP (ref 10.3–14.5)
SODIUM SERPL-SCNC: 135 MMOL/L — SIGNIFICANT CHANGE UP (ref 135–145)
SP GR SPEC: 1.01 — SIGNIFICANT CHANGE UP (ref 1.01–1.02)
UROBILINOGEN FLD QL: NEGATIVE — SIGNIFICANT CHANGE UP
WBC # BLD: 11.21 K/UL — HIGH (ref 3.8–10.5)
WBC # FLD AUTO: 11.21 K/UL — HIGH (ref 3.8–10.5)

## 2023-04-14 PROCEDURE — 96374 THER/PROPH/DIAG INJ IV PUSH: CPT

## 2023-04-14 PROCEDURE — 73080 X-RAY EXAM OF ELBOW: CPT

## 2023-04-14 PROCEDURE — 80053 COMPREHEN METABOLIC PANEL: CPT

## 2023-04-14 PROCEDURE — 73090 X-RAY EXAM OF FOREARM: CPT

## 2023-04-14 PROCEDURE — 85025 COMPLETE CBC W/AUTO DIFF WBC: CPT

## 2023-04-14 PROCEDURE — 76801 OB US < 14 WKS SINGLE FETUS: CPT | Mod: 26

## 2023-04-14 PROCEDURE — 73080 X-RAY EXAM OF ELBOW: CPT | Mod: 26,LT

## 2023-04-14 PROCEDURE — 86900 BLOOD TYPING SEROLOGIC ABO: CPT

## 2023-04-14 PROCEDURE — 84702 CHORIONIC GONADOTROPIN TEST: CPT

## 2023-04-14 PROCEDURE — 76802 OB US < 14 WKS ADDL FETUS: CPT

## 2023-04-14 PROCEDURE — 36415 COLL VENOUS BLD VENIPUNCTURE: CPT

## 2023-04-14 PROCEDURE — 81003 URINALYSIS AUTO W/O SCOPE: CPT

## 2023-04-14 PROCEDURE — 87086 URINE CULTURE/COLONY COUNT: CPT

## 2023-04-14 PROCEDURE — 86850 RBC ANTIBODY SCREEN: CPT

## 2023-04-14 PROCEDURE — 76817 TRANSVAGINAL US OBSTETRIC: CPT

## 2023-04-14 PROCEDURE — 99284 EMERGENCY DEPT VISIT MOD MDM: CPT

## 2023-04-14 PROCEDURE — 76817 TRANSVAGINAL US OBSTETRIC: CPT | Mod: 26,59

## 2023-04-14 PROCEDURE — 73090 X-RAY EXAM OF FOREARM: CPT | Mod: 26,LT

## 2023-04-14 PROCEDURE — 99284 EMERGENCY DEPT VISIT MOD MDM: CPT | Mod: 25

## 2023-04-14 PROCEDURE — 86901 BLOOD TYPING SEROLOGIC RH(D): CPT

## 2023-04-14 PROCEDURE — 83690 ASSAY OF LIPASE: CPT

## 2023-04-14 RX ORDER — METOCLOPRAMIDE HCL 10 MG
10 TABLET ORAL ONCE
Refills: 0 | Status: COMPLETED | OUTPATIENT
Start: 2023-04-14 | End: 2023-04-14

## 2023-04-14 RX ORDER — SODIUM CHLORIDE 9 MG/ML
1000 INJECTION INTRAMUSCULAR; INTRAVENOUS; SUBCUTANEOUS ONCE
Refills: 0 | Status: COMPLETED | OUTPATIENT
Start: 2023-04-14 | End: 2023-04-14

## 2023-04-14 RX ADMIN — SODIUM CHLORIDE 1000 MILLILITER(S): 9 INJECTION INTRAMUSCULAR; INTRAVENOUS; SUBCUTANEOUS at 14:36

## 2023-04-14 RX ADMIN — Medication 10 MILLIGRAM(S): at 14:36

## 2023-04-14 NOTE — ED PROVIDER NOTE - OBJECTIVE STATEMENT
27-year-old female with a past medical history of leukemia (age 10) presents with reports that she tripped and fell down approximately 5 steps landing on her left side.  Patient reporting left elbow/forearm pain and lower abdominal pain.  Patient reports that she is approximately 9 weeks pregnant.  LMP February 2.  G1, P0.  Patient reports that she was evaluated by her OB/GYN at 6 weeks gestation and had a positive IUP with cardiac activity.  Patient denies hitting her head, no LOC, no vaginal bleeding

## 2023-04-14 NOTE — ED ADULT TRIAGE NOTE - CHIEF COMPLAINT QUOTE
left arm and lower abdominal pain, s/p fall today, 9 weeks pregnant. Denies any LOC, denies any vaginal bleeding.

## 2023-04-14 NOTE — ED PROVIDER NOTE - PHYSICAL EXAMINATION
Head is normocephalic and atraumatic. No head tenderness or skull depression on palpation. No temporal cord-like sensation, increased warmth or tenderness. Pt is alert and oriented x 3, able to follow commands. No facial asymmetry. Pupils 5 mm equally round with PERRL, no nystagmus, EOM intact. Cranial nerves III-XII grossly intact. Coordination nose-to-finger intact. All limbs equally strong bilaterally 5/5. No pronator drift. No numbness or tingling sensation.  No spinal/paraspinal tenderness. Neck is non-tender, supple with full range of motion. No nuchal rigidity.     Left elbow/forearm - abrasion noted. Full ROM of elbow. +TTP of lateral epicondyle.

## 2023-04-14 NOTE — ED PROVIDER NOTE - PATIENT PORTAL LINK FT
You can access the FollowMyHealth Patient Portal offered by [facility] by registering at the following website: [email or web address]. By joining [employer]’s FollowMyHealth portal, you will also be able to view your health information using other applications (apps) compatible with our system.

## 2023-04-14 NOTE — ED PROVIDER NOTE - NSFOLLOWUPINSTRUCTIONS_ED_ALL_ED_FT
Follow up with your OBGYN as scheduled.     You can take tylenol as needed for pain.    If you experience any new or worsening symptoms or if you are concerned you can always come back to the emergency for a re-evaluation.

## 2023-04-14 NOTE — ED PROVIDER NOTE - CLINICAL SUMMARY MEDICAL DECISION MAKING FREE TEXT BOX
27 year old female , approx 9 weeks pregnant s/p fall down 5 steps. Reporting left elbow/forearm pain and abdominal pain. Will obtain labs, urine and ultrasounds to evaluate pregnancy. Shared decision making performed with patient regarding xrays and risk of radiation to fetus, low suspicion for fracture given full ROM, patient requesting xrays.

## 2023-04-14 NOTE — ED ADULT NURSE NOTE - OBJECTIVE STATEMENT
Pt presents to ED AAOx4, s/p falling down 5 stairs at home, currently 9 wks pregnant. Pt reports mild abdominal cramping, denies vaginal bleeding. Denies N/V/D, fever, chills. Pt states concern for pregnancy; pt has scratches and scrapes on left arm from breaking her fall. Pt denies hitting her belly during the fall, denies head trauma.

## 2023-04-16 LAB
CULTURE RESULTS: SIGNIFICANT CHANGE UP
SPECIMEN SOURCE: SIGNIFICANT CHANGE UP

## 2023-07-27 NOTE — PATIENT PROFILE ADULT - FUNCTIONAL ASSESSMENT - DAILY ACTIVITY 1.
Pc to pt     Pt needs surgical clearance for colonoscopy which is to be done on 08/04/23     Please schedule appt I have surgical clearance 4 = No assist / stand by assistance

## 2023-09-22 NOTE — CONSULT NOTE ADULT - NS ATTEND AMEND GEN_ALL_CORE FT
Patient seen/examined. Vascular surgery consulted for chronic splenic vein thrombosis. Patient has history of leukemia and pancreatitis with majority of pancreas burnt out. Abdomen- mild left upper quadrant tenderness. There is no role for vascular surgery in patient's pathology. Recommend management by GI and general surgery. post menopausal

## 2024-01-10 NOTE — CONSULT NOTE ADULT - CONSULT REASON
"Subjective:      Patient ID: Lina Farmer is a 65 y.o. female.    Vitals:  height is 5' 3" (1.6 m) and weight is 72 kg (158 lb 11.7 oz). Her oral temperature is 98.6 °F (37 °C). Her blood pressure is 116/65 and her pulse is 106. Her respiration is 16 and oxygen saturation is 94% (abnormal).     Chief Complaint: Sore Throat    Pt reports that she has been having a sore throat, cough, and headaches starting today. Pt has not been taking any medication for her symptoms. She reports taking care of her great grand son who has the flu. She tested negative at home for covid.     Sore Throat   This is a new problem. The current episode started today. The problem has been unchanged. Neither side of throat is experiencing more pain than the other. There has been no fever. The pain is at a severity of 1/10. The pain is mild. Associated symptoms include coughing and headaches. Pertinent negatives include no abdominal pain, congestion, diarrhea, drooling, ear discharge, ear pain, hoarse voice, plugged ear sensation, neck pain, shortness of breath, stridor, swollen glands, trouble swallowing or vomiting. She has had no exposure to strep or mono. She has tried nothing for the symptoms. The treatment provided no relief.       Constitution: Positive for fatigue. Negative for chills and fever.   HENT:  Positive for postnasal drip and sore throat. Negative for ear pain, ear discharge, drooling, congestion and trouble swallowing.    Neck: Negative for neck pain.   Cardiovascular:  Negative for chest pain.   Respiratory:  Positive for cough. Negative for shortness of breath and stridor.    Gastrointestinal:  Negative for abdominal pain, nausea, vomiting and diarrhea.   Musculoskeletal:  Negative for muscle ache.   Neurological:  Positive for headaches.      Objective:     Physical Exam   Constitutional: She is oriented to person, place, and time. She appears well-developed. She is cooperative.  Non-toxic appearance. She does not " appear ill. No distress.   HENT:   Head: Normocephalic and atraumatic.   Ears:   Right Ear: Hearing, tympanic membrane, external ear and ear canal normal.   Left Ear: Hearing, tympanic membrane, external ear and ear canal normal.   Nose: Nose normal. No mucosal edema, rhinorrhea, nasal deformity or congestion. No epistaxis. Right sinus exhibits no maxillary sinus tenderness and no frontal sinus tenderness. Left sinus exhibits no maxillary sinus tenderness and no frontal sinus tenderness.   Mouth/Throat: Uvula is midline, oropharynx is clear and moist and mucous membranes are normal. No trismus in the jaw. Normal dentition. No uvula swelling. No oropharyngeal exudate, posterior oropharyngeal edema or posterior oropharyngeal erythema.   Eyes: Conjunctivae and lids are normal. Pupils are equal, round, and reactive to light. No scleral icterus.   Neck: Trachea normal and phonation normal. Neck supple. No edema present. No erythema present. No neck rigidity present.   Cardiovascular: Normal rate, regular rhythm, normal heart sounds and normal pulses.   Pulmonary/Chest: Effort normal and breath sounds normal. No stridor. No respiratory distress. She has no decreased breath sounds. She has no wheezes. She has no rhonchi. She has no rales.   Abdominal: Normal appearance.   Musculoskeletal: Normal range of motion.         General: No deformity. Normal range of motion.   Lymphadenopathy:     She has cervical adenopathy.   Neurological: She is alert and oriented to person, place, and time. She exhibits normal muscle tone. Coordination normal.   Skin: Skin is warm, dry, intact, not diaphoretic and not pale.   Psychiatric: Her speech is normal and behavior is normal. Judgment and thought content normal.   Nursing note and vitals reviewed.      Assessment:     1. Influenza    2. Sore throat    3. Cough, unspecified type        Plan:     Results for orders placed or performed in visit on 01/10/24   POCT Influenza A/B MOLECULAR    Result Value Ref Range    POC Molecular Influenza A Ag Negative Negative, Not Reported    POC Molecular Influenza B Ag Negative Negative, Not Reported     Acceptable Yes        Influenza  -     oseltamivir (TAMIFLU) 75 MG capsule; Take 1 capsule (75 mg total) by mouth 2 (two) times daily. for 5 days  Dispense: 10 capsule; Refill: 0    Sore throat  -     POCT Influenza A/B MOLECULAR    Cough, unspecified type  -     benzonatate (TESSALON) 100 MG capsule; Take 1 capsule (100 mg total) by mouth 3 (three) times daily as needed for Cough.  Dispense: 30 capsule; Refill: 0            Discussed results/diagnosis/plan with patient in clinic. Strict precautions given to patient to monitor for worsening signs and symptoms. Advised to follow up with PCP or specialist.  Explained side effects of medications prescribed with patient and informed him/her to discontinue use if he/she has any side effects and to inform UC or PCP if this occurs. All questions answered. Strict ED verses clinic return precautions stressed and given in depth. Advised if symptoms worsens of fail to improve he/she should go to the Emergency Room. Discharge and follow-up instructions given verbally/printed with the patient who expressed understanding and willingness to comply with my recommendations. Patient voiced understanding and in agreement with current treatment plan. Patient exits the exam room in no acute distress. Conversant and engaged during discharge discussion, verbalized understanding.       history of ALL , chronic splenic vein thrombus

## 2024-02-27 NOTE — ED PROVIDER NOTE - NSTIMEPROVIDERCAREINITIATE_GEN_ER
Pt attempted to elope. [facility] [employer] notified. Provider verbally indicated prior to elopement that pt was danger to herself and pt was pink slipped for her safety.     14-Apr-2023 14:13

## 2024-05-30 ENCOUNTER — OUTPATIENT (OUTPATIENT)
Dept: OUTPATIENT SERVICES | Facility: HOSPITAL | Age: 29
LOS: 1 days | End: 2024-05-30

## 2024-05-30 VITALS
WEIGHT: 207.9 LBS | DIASTOLIC BLOOD PRESSURE: 70 MMHG | RESPIRATION RATE: 16 BRPM | HEART RATE: 60 BPM | TEMPERATURE: 98 F | SYSTOLIC BLOOD PRESSURE: 114 MMHG | OXYGEN SATURATION: 99 % | HEIGHT: 61.5 IN

## 2024-05-30 DIAGNOSIS — K80.51 CALCULUS OF BILE DUCT WITHOUT CHOLANGITIS OR CHOLECYSTITIS WITH OBSTRUCTION: ICD-10-CM

## 2024-05-30 DIAGNOSIS — Z98.891 HISTORY OF UTERINE SCAR FROM PREVIOUS SURGERY: Chronic | ICD-10-CM

## 2024-05-30 DIAGNOSIS — K80.20 CALCULUS OF GALLBLADDER WITHOUT CHOLECYSTITIS WITHOUT OBSTRUCTION: ICD-10-CM

## 2024-05-30 LAB — HCG UR QL: NEGATIVE — SIGNIFICANT CHANGE UP

## 2024-05-30 RX ORDER — SPIRONOLACTONE 25 MG/1
1 TABLET, FILM COATED ORAL
Qty: 0 | Refills: 0 | DISCHARGE

## 2024-05-30 NOTE — H&P PST ADULT - HISTORY OF PRESENT ILLNESS
28 year old female with PMH of Leukemia (2007- s/p Chemo), joint pain (follows up with rheumatologist) , presents to Presurgical testing with diagnosis of Calculus BD w/o cholangitis or cholecyst w/obst scheduled for Laparoscopic cholecystectomy possible open biliary colic

## 2024-05-30 NOTE — H&P PST ADULT - PROBLEM SELECTOR PLAN 1
Patient tentatively scheduled for Laparoscopic cholecystectomy possible open biliary colic 6/6/24.  Pre-op instructions provided. Pt given verbal and written instructions with teach back on chlorhexidine wash and pepcid. Pt verbalized understanding with return demonstration.   Urine cup provided for day of procedure for pregnancy test.    CBC, CMP done at PCP on 5/24/24 - copy requested.

## 2024-06-05 ENCOUNTER — TRANSCRIPTION ENCOUNTER (OUTPATIENT)
Age: 29
End: 2024-06-05

## 2024-06-05 RX ORDER — GABAPENTIN 400 MG/1
1 CAPSULE ORAL
Refills: 0 | DISCHARGE

## 2024-06-05 NOTE — ASU PATIENT PROFILE, ADULT - FALL HARM RISK - UNIVERSAL INTERVENTIONS
Bed in lowest position, wheels locked, appropriate side rails in place/Call bell, personal items and telephone in reach/Instruct patient to call for assistance before getting out of bed or chair/Non-slip footwear when patient is out of bed/[place] to call system/Physically safe environment - no spills, clutter or unnecessary equipment/Purposeful Proactive Rounding/Room/bathroom lighting operational, light cord in reach

## 2024-06-05 NOTE — ASU PATIENT PROFILE, ADULT - FALL HARM RISK - PATIENT NEEDS ASSISTANCE
[place] History and Physical    Boy Agata \"Michael\" Nataliia is a 7-hour old male 6 lb 2.4 oz (2790 g) infant, delivered at Gestational Age: 36w6d on 2023 at 0149 via  to a 33 yo  O+ mother Pregnancy notable for late / insufficient prenatal care, GBS colonization, and history of substance abuse (recent UDS negative). Due to late prenatal care, an anatomy scan was not obtained but an US was obtained that demonstrated normal kidneys, bladder, and stomach with a normal profile. Delivery was uncomplicated. Infant is well appearing but appears to have mild retrognathia.    MATERNAL INFORMATION:     Age, /Para, GUNNAR:   Information for the patient's mother:  Agata William [[id number]]   34 year old          2023, by Last Menstrual Period       steroids during pregnancy: None     Information for the patient's mother:  Agata William [[id number]]     Social History     Tobacco Use   • Smoking status: Every Day     Packs/day: 0.25     Types: Cigarettes   • Smokeless tobacco: Former   Substance Use Topics   • Alcohol use: Not Currently     Alcohol/week: 0.0 standard drinks      Information for the patient's mother:  Agata William [[id number]]     Past Medical History:   Diagnosis Date   • Anemia    • Asthma, exercise induced    • history of Polysubstance abuse (CMD)     sober for 3 yrs        Prenatal Labs:  Information for the patient's mother:  Agata William [[id number]]     Recent Labs   Lab 23  1626 23  1641   GBS Positive for Streptococcus agalactiae (Strep Group B)*  --    Hepatitis B Surface Antigen  --  Negative   Hepatitis C Antibody  --  Negative   Treponema Pallidum Antibody, IgG and IgM  --  Nonreactive   Neisseria gonorrhoeae by Nucleic Acid Amplification Negative  --    Chlamydia trachomatis by Nucleic Acid Amplification Negative  --    Rubella Antibody, IgG  --  37.8      Information for the patient's mother:  Agata William [[id number]]   No results for  input(s): CULT, SDES in the last 8765 hours.     GBS: Positive. Mother received Penicillin x1 >4 hours prior to delivery (adequate IAP)    BIRTH HISTORY:     Delivery Method:     Rupture date & time: 2023 6:00 PM   Date & time of birth 20[birth year] 1:49 AM   Induction:       Complications/ Risks None     Placenta appearance: Intact   Cord info/complications: 3 Vessels None       Delayed cord clamping: Yes   Indications for :     Presentation/position:           Forceps attempted? No     Vacuum attempted? No     Shoulder dystocia? No       INFORMATION     Resuscitation:  Suctioning          APGARS  One minute Five minutes   Skin color: 0  1    Heart rate: 2  2     Reflex: 2  2    Muscle tone: 2  2    Breathin  2    Totals:   8  9      Cord Blood/[place] Evaluation (CRD)  Recent Labs   Lab 23  0202   ABORHDABR B Rh Negative   DIGG Negative     Blood gases sent? No   Cord pH No results found    PHYSICAL EXAM     VITALS:   Visit Vitals  Pulse 156   Temp 97.9 °F (36.6 °C) (Axillary)   Resp 48   Ht 19.5\" (49.5 cm) Comment: Filed from Delivery Summary   Wt 2.79 kg (6 lb 2.4 oz) Comment: Filed from Delivery Summary   HC 32.5 cm (12.8\") Comment: Filed from Delivery Summary   BMI 11.37 kg/m²     Intake/Output        0700   0659  0700   0659    P.O. 10 20    Total Intake 10 20    Net +10 +20                Birth Measurements:        Weight: 6 lb 2.4 oz (2790 g)        Length: 19.5\"        Head circumference: 32.5 cm    GENERAL:Baby Boy is an alert, vigorous male with appropriate behavior. He is in no acute distress.  SKIN: His skin is warm with normal turgor. The color of the skin is pink. There is no rash. There are no bruises or other signs of injury.  Significant jaundice is not present.  HEAD: The head is atraumatic and normocephalic. The anterior fontanel is open and flat.  EYES: The conjunctivae appear normal with neither icterus nor subconjunctival hemorrhage.   Red  reflexes are seen bilaterally.  EARS: Pinnae normal.  NOSE: There is no nasal flaring, nares patent bilaterally.  THROAT:  The oropharynx is normal.  There is no cleft of the palate.  MOUTH: Mild retrognathia  NECK: Clavicles without crepitus.  TRUNK AND THORAX: There are no lesions on the trunk; there is no dimple over the presacral area. There are no retractions.  LUNGS: The lung fields are clear to auscultation.  HEART: The precordium is quiet. The heart rhythm is grossly regular. S1 and S2 are normal. There are no murmurs. Normal femoral pulses.  ABDOMEN: The umbilical cord stump is normal. There is not an umbilical hernia. The abdomen is flat and soft.   GENITALIA: Urine bag present obscuring genitalia. Bilaterally descended testicles  RECTAL: anus patent  EXTREMITIES: Moving all 4 extremities. The hip exam is normal  . There are no hip clicks or clunks.    NEUROLOGIC: He displays normal tone throughout. He is not jittery.    ASSESSMENT     Well 0 day old male infant.    Patient Active Problem List   Diagnosis   •  , gestational age 36w 6d   • Retrognathia       PLAN   Routine  care.     Sepsis:    Risk Scores: Risk - Well Appearin.04; Risk - Equivocal: 0.43   Sepsis Classification: (most recent)    Plan: Based on risk scores and a current classification of Well Appearing, routine vital signs. no CBC or blood cultures. No antibiotics.       Mild retrognathia:  - Will have Speech Therapy evaluate    History of maternal substance abuse:  - Mother's recent UDS is negative  - Will obtain a UDS and meconium drug screen on the infant     infant at 36w 6d:  - Will obtain serial glucose checks per protocol    Feeding:  [place] is currently being fed Breast milk and formula.   I am aware that mother's feeding choice upon admission was the following:   Information for the patient's mother:  Agata William [[id number]]   Breast milk feeding with formula supplementation   There are  no medical contraindications to exclusive breast milk feeding, and the benefits of exclusively breastfeeding were discussed/reinforced with the mother.      Janak Toussaint DO  03/22/23             No assistance needed

## 2024-06-06 ENCOUNTER — TRANSCRIPTION ENCOUNTER (OUTPATIENT)
Age: 29
End: 2024-06-06

## 2024-06-06 ENCOUNTER — OUTPATIENT (OUTPATIENT)
Dept: OUTPATIENT SERVICES | Facility: HOSPITAL | Age: 29
LOS: 1 days | Discharge: HOME | End: 2024-06-06
Payer: COMMERCIAL

## 2024-06-06 VITALS
HEIGHT: 61 IN | WEIGHT: 207.9 LBS | RESPIRATION RATE: 16 BRPM | OXYGEN SATURATION: 97 % | HEART RATE: 71 BPM | TEMPERATURE: 99 F | SYSTOLIC BLOOD PRESSURE: 102 MMHG | DIASTOLIC BLOOD PRESSURE: 64 MMHG

## 2024-06-06 VITALS
HEART RATE: 76 BPM | OXYGEN SATURATION: 99 % | RESPIRATION RATE: 17 BRPM | SYSTOLIC BLOOD PRESSURE: 114 MMHG | DIASTOLIC BLOOD PRESSURE: 64 MMHG

## 2024-06-06 DIAGNOSIS — K80.51 CALCULUS OF BILE DUCT WITHOUT CHOLANGITIS OR CHOLECYSTITIS WITH OBSTRUCTION: ICD-10-CM

## 2024-06-06 DIAGNOSIS — Z98.891 HISTORY OF UTERINE SCAR FROM PREVIOUS SURGERY: Chronic | ICD-10-CM

## 2024-06-06 LAB — HCG UR QL: NEGATIVE — SIGNIFICANT CHANGE UP

## 2024-06-06 PROCEDURE — 88304 TISSUE EXAM BY PATHOLOGIST: CPT | Mod: 26

## 2024-06-06 DEVICE — CLIP APPLIER COVIDIEN ENDOCLIP II 10MM MED/LG: Type: IMPLANTABLE DEVICE | Site: BILATERAL | Status: FUNCTIONAL

## 2024-06-06 RX ORDER — FENTANYL CITRATE 50 UG/ML
50 INJECTION INTRAVENOUS
Refills: 0 | Status: DISCONTINUED | OUTPATIENT
Start: 2024-06-06 | End: 2024-06-06

## 2024-06-06 RX ORDER — ONDANSETRON 8 MG/1
4 TABLET, FILM COATED ORAL ONCE
Refills: 0 | Status: COMPLETED | OUTPATIENT
Start: 2024-06-06 | End: 2024-06-06

## 2024-06-06 RX ORDER — OXYCODONE HYDROCHLORIDE 5 MG/1
1 TABLET ORAL
Qty: 6 | Refills: 0
Start: 2024-06-06

## 2024-06-06 RX ORDER — OXYCODONE HYDROCHLORIDE 5 MG/1
5 TABLET ORAL ONCE
Refills: 0 | Status: DISCONTINUED | OUTPATIENT
Start: 2024-06-06 | End: 2024-06-06

## 2024-06-06 RX ADMIN — FENTANYL CITRATE 50 MICROGRAM(S): 50 INJECTION INTRAVENOUS at 10:15

## 2024-06-06 RX ADMIN — FENTANYL CITRATE 50 MICROGRAM(S): 50 INJECTION INTRAVENOUS at 12:15

## 2024-06-06 RX ADMIN — FENTANYL CITRATE 50 MICROGRAM(S): 50 INJECTION INTRAVENOUS at 11:56

## 2024-06-06 RX ADMIN — OXYCODONE HYDROCHLORIDE 5 MILLIGRAM(S): 5 TABLET ORAL at 11:00

## 2024-06-06 RX ADMIN — ONDANSETRON 4 MILLIGRAM(S): 8 TABLET, FILM COATED ORAL at 10:03

## 2024-06-06 RX ADMIN — OXYCODONE HYDROCHLORIDE 5 MILLIGRAM(S): 5 TABLET ORAL at 10:23

## 2024-06-06 RX ADMIN — FENTANYL CITRATE 50 MICROGRAM(S): 50 INJECTION INTRAVENOUS at 10:04

## 2024-06-06 NOTE — ASU DISCHARGE PLAN (ADULT/PEDIATRIC) - NPI NUMBER (FOR SYSADMIN USE ONLY) :
[[id number]] Cartilage Graft Text: The defect edges were debeveled with a #15 scalpel blade.  Given the location of the defect, shape of the defect, the fact the defect involved a full thickness cartilage defect a cartilage graft was deemed most appropriate.  An appropriate donor site was identified, cleansed, and anesthetized. The cartilage graft was then harvested and transferred to the recipient site, oriented appropriately and then sutured into place.  The secondary defect was then repaired using a primary closure.

## 2024-06-06 NOTE — ASU DISCHARGE PLAN (ADULT/PEDIATRIC) - NS MD DC FALL RISK RISK
For information on Fall & Injury Prevention, visit: [email or web address] OR  [email or web address] OR  https://www.cdc.gov/steadi/patient.html
good balance

## 2024-06-06 NOTE — ASU DISCHARGE PLAN (ADULT/PEDIATRIC) - ASU DC SPECIAL INSTRUCTIONSFT
PAIN CONTROL: Take over the counter Tylenol and Ibuprofen. May take prescribed oxycodone for breakthrough pain.  WOUND CARE: Keep incision clean and dry.  Cover with dry, sterile dressing.  BATHING: Please do not submerge wound underwater. You may shower and/or sponge bathe.  ACTIVITY: No heavy lifting or straining. Otherwise, you may return to your usual level of physical activity. If you are taking narcotic pain medication (such as Percocet), do NOT drive a car, operate machinery or make important decisions.  DIET: Regular  NOTIFY YOUR SURGEON IF: You have any bleeding that does not stop, any pus draining from your wound, any fever (over 100.4 F) or chills, persistent nausea/vomiting, persistent diarrhea, or if your pain is not controlled on your discharge pain medications.  FOLLOW-UP:  1. Follow-up with Dr. Golden within 2 weeks of discharge.  Please call office for appointment.  2. Please follow up with your primary care physician in one week regarding your hospitalization.

## 2024-06-06 NOTE — PACU DISCHARGE NOTE - HYDRATION STATUS:
Patient Education     Sinusitis (Antibiotic Treatment)    The sinuses are air-filled spaces within the bones of the face. They connect to the inside of the nose. Sinusitis is an inflammation of the tissue lining the sinus cavity. Sinus inflammation can occur during a cold. It can also be due to allergies to pollens and other particles in the air. Sinusitis can cause symptoms of sinus congestion and fullness. A sinus infection causes fever, headache and facial pain. There is often green or yellow drainage from the nose or into the back of the throat (post-nasal drip). You have been given antibiotics to treat this condition.  Home care:  · Take the full course of antibiotics as instructed. Do not stop taking them, even if you feel better.  · Drink plenty of water, hot tea, and other liquids. This may help thin mucus. It also may promote sinus drainage.  · Heat may help soothe painful areas of the face. Use a towel soaked in hot water. Or,  the shower and direct the hot spray onto your face. Using a vaporizer along with a menthol rub at night may also help.   · An expectorant containing guaifenesin may help thin the mucus and promote drainage from the sinuses.  · Over-the-counter decongestants may be used unless a similar medicine was prescribed. Nasal sprays work the fastest. Use one that contains phenylephrine or oxymetazoline. First blow the nose gently. Then use the spray. Do not use these medicines more often than directed on the label or symptoms may get worse. You may also use tablets containing pseudoephedrine. Avoid products that combine ingredients, because side effects may be increased. Read labels. You can also ask the pharmacist for help. (NOTE: Persons with high blood pressure should not use decongestants. They can raise blood pressure.)  · Over-the-counter antihistamines may help if allergies contributed to your sinusitis.    · Do not use nasal rinses or irrigation during an acute sinus  Satisfactory infection, unless told to by your health care provider. Rinsing may spread the infection to other sinuses.  · Use acetaminophen or ibuprofen to control pain, unless another pain medicine was prescribed. (If you have chronic liver or kidney disease or ever had a stomach ulcer, talk with your doctor before using these medicines. Aspirin should never be used in anyone under 18 years of age who is ill with a fever. It may cause severe liver damage.)  · Don't smoke. This can worsen symptoms.  Follow-up care  Follow up with your healthcare provider or our staff if you are not improving within the next week.  When to seek medical advice  Call your healthcare provider if any of these occur:  · Facial pain or headache becoming more severe  · Stiff neck  · Unusual drowsiness or confusion  · Swelling of the forehead or eyelids  · Vision problems, including blurred or double vision  · Fever of 100.4ºF (38ºC) or higher, or as directed by your healthcare provider  · Seizure  · Breathing problems  · Symptoms not resolving within 10 days  © 2628-7831 [employer]. [address] [place] [address]. All rights reserved. This information is not intended as a substitute for professional medical care. Always follow your healthcare professional's instructions.         Patient Education     Sinus Headache    The sinuses are air-filled spaces within the bones of the face. They connect to the inside of the nose. Sinusitis is an inflammation of the tissue lining the sinus cavity. Sinus inflammation can occur during a cold or hay fever (allergies to pollens and other particles in the air) and cause symptoms of sinus congestion and fullness and perhaps a low-grade fever. An infection is usually present when there is also facial pain or headache and green or yellow drainage from the nose or into the back of the throat (postnasal drip). Antibiotics are often prescribed to treat this condition.  Sinus headache may cause pain  in different places, depending on which sinuses are infected. There may be pain in the temples, forehead, top of the head, behind or around the eye, across the cheekbone, or into the upper teeth.  You may find that changing your position, sitting upright or lying down, will bring some relief.  Home care  The following guidelines will help you care for yourself at home:  1. Drink plenty of water, hot tea, and other liquids to stay well hydrated. This thins the mucus and promotes sinus drainage.  2. Apply heat to the painful areas of the face. Use a towel soaked in hot water. Or,  the shower and direct the hot spray onto your face. This is a good way to inhale warm water vapor and get heat on your face at the same time. (Cover your mouth and nose with your hands so you can still breathe as you do this.)  3. Use a cool mist vaporizer at night or breathe in steam from a hot shower. Suck on peppermint, menthol, or eucalyptus hard candies during the day.  4. An expectorant containing guaifenesin helps thin the mucus and promote drainage from the sinuses.  5. Over-the-counter decongestants may be used unless a similar medicine was prescribed. Nasal sprays work the fastest. Use one that contains phenylephrine or oxymetazoline. First blow the nose gently to remove mucus. Then apply the drops. Don't use decongestant nasal sprays more often than the label says or for more than 3 days. This can make symptoms worse. Nasal sprays prescribed from your doctor typically do not have these restrictions. Check with your doctor or pharmacist. You may also use tablets containing pseudoephedrine. Many sinus remedies combine ingredients, which may increase side effects. Also, if you are taking a combination medicine with another medicine, be sure you are not taking a double dose of anything by mistake. Read the labels or ask the pharmacist for help. [NOTE: Those with high blood pressure should not use decongestants. They can raise  blood pressure.]  6. Antihistamines are useful if allergies are a cause of your sinusitis. You can get chlorpheniramine and diphenhydramine over the counter, but these can cause drowsiness. [NOTE: Don't use these if you have glaucoma or if you are a man with trouble urinating due to an enlarged prostate.] Over-the-counter antihistamines containing loratidine and cetirizine cause less drowsiness and are a good choice for daytime use.  7. When allergies are the cause for sinusitis, a saline nasal rinse may give relief. Saline nasal rinse reduces swelling and clears excess mucus. This allows sinuses to drain. Prepackaged kits are available at most drugstores. These contain premixed salt packets and an irrigation device. If antibiotics have been prescribed to treat an acute sinus infection, talk to your doctor before using a nasal rinse to be sure it is safe for you.  8. You may use acetaminophen or ibuprofen to control pain, unless another pain medicine was prescribed. [NOTE: If you have chronic liver or kidney disease or ever had a stomach ulcer, talk with your doctor before using these medicines.] (Aspirin should never be used in anyone under 18 years of age who has a fever. It may cause severe liver damage.)  9. If antibiotics were given, finish all of them, even if you are feeling better after a few days.  Follow-up care  Follow up with your doctor or this facility in 1 week or as instructed by our staff if not improving.  When to seek medical care  Get prompt medical attention if any of the following occur:  · Facial pain or headache becomes more severe  · Stiff neck  · Unusual drowsiness or confusion  · Swelling of the forehead or eyelids  · Vision problems including blurred or double vision  · Fever over 100.4º F (38.0º C) oral for more than 3 days on antibiotics  · Bleeding from the nose or throat  · Seizure  © 6691-3288 [employer]. [address] [place] [address], [place] [address]. All rights reserved.  This information is not intended as a substitute for professional medical care. Always follow your healthcare professional's instructions.         Your Patient  today was Elva. Thank you for letting Elva assist in registering your visit today!  Your Registered Nurse today was Michelle MCQUEEN. Thank you for allowing Michelle ROSALES,  to also participate in providing you with care today!  Thank you for choosing [facility].  We are pleased that you have selected us to provide you with your care today. Our goal is to make sure you receive exceptional top notch care. We value your opinion, and welcome the opportunity to address any questions, concerns or comments you may have. We encourage you to contact Myranda Rivera, Urgent Care Manager, at [phone]. Additionally, you may receive a survey after you go home in the mail or through e-mail. We hope that you will take the time to provide us with your feedback as we use this information to recognize our staff, and to improve our services. Thank you for the privilege of caring for you!             [Parents] : parents [Formula ___ oz/feed] : [unfilled] oz of formula per feed [Fruit] : fruit [Baby food] : baby food [Normal] : Normal [No] : No cigarette smoke exposure [Water heater temperature set at <120 degrees F] : Water heater temperature set at <120 degrees F [Rear facing car seat in back seat] : Rear facing car seat in back seat [Smoke Detectors] : Smoke detectors [Carbon Monoxide Detectors] : Carbon monoxide detectors [At risk for exposure to lead] : Not at risk for exposure to lead  [Infant walker] : No Infant walker [At risk for exposure to TB] : Not at risk for exposure to Tuberculosis  [Gun in Home] : No gun in home

## 2024-06-06 NOTE — ASU DISCHARGE PLAN (ADULT/PEDIATRIC) - NURSING INSTRUCTIONS
You received IV Tylenol for pain management at 8:00am. Please DO NOT take any Tylenol (Acetaminophen) containing products, such as Vicodin, Percocet, Excedrin, and cold medications for the next 6 hours (until 2:00pm). DO NOT TAKE MORE THAN 3000 MG OF TYLENOL in a 24 hour period.     You received IV Toradol for pain management at 9:00am. Please DO NOT take Motrin/Ibuprofen/Advil/Aleve/NSAIDs (Non-Steroidal Anti-Inflammatory Drugs) for the next 6 hours (until 3:00pm).

## 2024-06-06 NOTE — ASU DISCHARGE PLAN (ADULT/PEDIATRIC) - FOLLOW UP APPOINTMENTS
[facility], [facility], [facility]

## 2024-06-06 NOTE — ASU DISCHARGE PLAN (ADULT/PEDIATRIC) - CARE PROVIDER_API CALL
Jason Golden  Surgery  [address], [address]  [place] [address]  Phone: [phone]  Fax: [phone]  Follow Up Time: 2 weeks

## 2024-06-14 LAB — SURGICAL PATHOLOGY STUDY: SIGNIFICANT CHANGE UP

## 2024-06-14 NOTE — PATIENT PROFILE ADULT - HEALTH LITERACY
Problem: PAIN - ADULT  Goal: Verbalizes/displays adequate comfort level or baseline comfort level  Description: Interventions:  - Encourage patient to monitor pain and request assistance  - Assess pain using appropriate pain scale  - Administer analgesics based on type and severity of pain and evaluate response  - Implement non-pharmacological measures as appropriate and evaluate response  - Consider cultural and social influences on pain and pain management  - Notify physician/advanced practitioner if interventions unsuccessful or patient reports new pain  Outcome: Progressing     Problem: INFECTION - ADULT  Goal: Absence or prevention of progression during hospitalization  Description: INTERVENTIONS:  - Assess and monitor for signs and symptoms of infection  - Monitor lab/diagnostic results  - Monitor all insertion sites, i.e. indwelling lines, tubes, and drains  - Monitor endotracheal if appropriate and nasal secretions for changes in amount and color  - [place] appropriate cooling/warming therapies per order  - Administer medications as ordered  - Instruct and encourage patient and family to use good hand hygiene technique  - Identify and instruct in appropriate isolation precautions for identified infection/condition  Outcome: Progressing  Goal: Absence of fever/infection during neutropenic period  Description: INTERVENTIONS:  - Monitor WBC    Outcome: Progressing     Problem: SAFETY ADULT  Goal: Patient will remain free of falls  Description: INTERVENTIONS:  - Educate patient/family on patient safety including physical limitations  - Instruct patient to call for assistance with activity   - Consult OT/PT to assist with strengthening/mobility   - Keep Call bell within reach  - Keep bed low and locked with side rails adjusted as appropriate  - Keep care items and personal belongings within reach  - Initiate and maintain comfort rounds  - Make Fall Risk Sign visible to staff  - Offer Toileting every 2 Hours,  in advance of need  - Initiate/Maintain bed alarm  - Obtain necessary fall risk management equipment: yellow socks  - Apply yellow socks and bracelet for high fall risk patients  - Consider moving patient to room near nurses station  Outcome: Progressing  Goal: Maintain or return to baseline ADL function  Description: INTERVENTIONS:  -  Assess patient's ability to carry out ADLs; assess patient's baseline for ADL function and identify physical deficits which impact ability to perform ADLs (bathing, care of mouth/teeth, toileting, grooming, dressing, etc.)  - Assess/evaluate cause of self-care deficits   - Assess range of motion  - Assess patient's mobility; develop plan if impaired  - Assess patient's need for assistive devices and provide as appropriate  - Encourage maximum independence but intervene and supervise when necessary  - Involve family in performance of ADLs  - Assess for home care needs following discharge   - Consider OT consult to assist with ADL evaluation and planning for discharge  - Provide patient education as appropriate  Outcome: Progressing  Goal: Maintains/Returns to pre admission functional level  Description: INTERVENTIONS:  - Perform AM-PAC 6 Click Basic Mobility/ Daily Activity assessment daily.  - Set and communicate daily mobility goal to care team and patient/family/caregiver.   - Collaborate with rehabilitation services on mobility goals if consulted  - Perform Range of Motion 3 times a day.  - Reposition patient every 2 hours.  - Dangle patient 3 times a day  - Stand patient 3 times a day  - Ambulate patient 3 times a day  - Out of bed to chair 3 times a day   - Out of bed for meals 3 times a day  - Out of bed for toileting  - Record patient progress and toleration of activity level   Outcome: Progressing     Problem: DISCHARGE PLANNING  Goal: Discharge to home or other facility with appropriate resources  Description: INTERVENTIONS:  - Identify barriers to discharge w/patient and  caregiver  - Arrange for needed discharge resources and transportation as appropriate  - Identify discharge learning needs (meds, wound care, etc.)  - Arrange for interpretive services to assist at discharge as needed  - Refer to Case Management Department for coordinating discharge planning if the patient needs post-hospital services based on physician/advanced practitioner order or complex needs related to functional status, cognitive ability, or social support system  Outcome: Progressing     Problem: Knowledge Deficit  Goal: Patient/family/caregiver demonstrates understanding of disease process, treatment plan, medications, and discharge instructions  Description: Complete learning assessment and assess knowledge base.  Interventions:  - Provide teaching at level of understanding  - Provide teaching via preferred learning methods  Outcome: Progressing      no

## (undated) DEVICE — TROCAR COVIDIEN VERSAONE BLADED FIXATION 11MM STANDARD

## (undated) DEVICE — DRAPE 3/4 SHEET 52X76"

## (undated) DEVICE — SUT CHROMIC 3-0 27" SH

## (undated) DEVICE — POSITIONER STRAP ARMBOARD VELCRO TS-30

## (undated) DEVICE — WARMING BLANKET UPPER ADULT

## (undated) DEVICE — TIP METZENBAUM SCISSOR MONOPOLAR ENDOCUT (ORANGE)

## (undated) DEVICE — TROCAR COVIDIEN VERSAONE BLUNT TIP HASSAN 12MM

## (undated) DEVICE — ELCTR GROUNDING PAD ADULT COVIDIEN

## (undated) DEVICE — TUBING OLYMPUS INSUFFLATION

## (undated) DEVICE — D HELP - CLEARVIEW CLEARIFY SYSTEM

## (undated) DEVICE — VENODYNE/SCD SLEEVE CALF MEDIUM

## (undated) DEVICE — PACK GENERAL LAPAROSCOPY

## (undated) DEVICE — BASIN SET SINGLE

## (undated) DEVICE — CANISTER DISPOSABLE THIN WALL 3000CC

## (undated) DEVICE — SUT PROLENE 0 30" CT-2

## (undated) DEVICE — TUBING STRYKEFLOW II SUCTION / IRRIGATOR

## (undated) DEVICE — SOL IRR POUR H2O 500ML

## (undated) DEVICE — PROTECTOR HEEL / ELBOW

## (undated) DEVICE — BAG SPECIMEN RETRIEVAL ENDOBAG 3X6"

## (undated) DEVICE — SOL INJ NS 0.9% 1000ML

## (undated) DEVICE — ENDOCATCH 10MM SPECIMEN POUCH

## (undated) DEVICE — SOL IRR POUR NS 0.9% 1000ML

## (undated) DEVICE — DRSG BENZOIN 0.6CC

## (undated) DEVICE — ELCTR BOVIE TIP BLADE INSULATED 2.75" EDGE

## (undated) DEVICE — ELCTR BOVIE PENCIL BLADE 10FT

## (undated) DEVICE — SUT MONOCRYL 4-0 27" PS-2 UNDYED

## (undated) DEVICE — SMOKE EVACUTATION SYS LAPROSCOPIC AC/PA

## (undated) DEVICE — TROCAR COVIDIEN VERSAONE BLADELESS FIXATION 11MM STANDARD

## (undated) DEVICE — CATH ANGIO 14G X 3.25"

## (undated) DEVICE — GLV 7.5 PROTEXIS (CREAM) MICRO

## (undated) DEVICE — DISSECTOR ENDOSCOPIC KITTNER SINGLE TIP

## (undated) DEVICE — TROCAR COVIDIEN VERSAPORT BLADELESS OPTICAL 5MM STANDARD

## (undated) DEVICE — SYR LUER LOK 20CC

## (undated) DEVICE — BLADE SURGICAL #15 CARBON